# Patient Record
Sex: MALE | Race: WHITE | Employment: OTHER | URBAN - NONMETROPOLITAN AREA
[De-identification: names, ages, dates, MRNs, and addresses within clinical notes are randomized per-mention and may not be internally consistent; named-entity substitution may affect disease eponyms.]

---

## 2017-01-19 ENCOUNTER — OFFICE VISIT (OUTPATIENT)
Dept: NEUROLOGY | Age: 57
End: 2017-01-19
Payer: MEDICARE

## 2017-01-19 VITALS
WEIGHT: 315 LBS | HEART RATE: 63 BPM | SYSTOLIC BLOOD PRESSURE: 134 MMHG | RESPIRATION RATE: 16 BRPM | DIASTOLIC BLOOD PRESSURE: 72 MMHG | HEIGHT: 76 IN | BODY MASS INDEX: 38.36 KG/M2

## 2017-01-19 DIAGNOSIS — G25.81 RESTLESS LEG SYNDROME: ICD-10-CM

## 2017-01-19 DIAGNOSIS — R40.0 SOMNOLENCE, DAYTIME: ICD-10-CM

## 2017-01-19 DIAGNOSIS — G47.33 OBSTRUCTIVE SLEEP APNEA: Primary | ICD-10-CM

## 2017-01-19 PROCEDURE — G8484 FLU IMMUNIZE NO ADMIN: HCPCS | Performed by: PHYSICIAN ASSISTANT

## 2017-01-19 PROCEDURE — 4004F PT TOBACCO SCREEN RCVD TLK: CPT | Performed by: PHYSICIAN ASSISTANT

## 2017-01-19 PROCEDURE — 3017F COLORECTAL CA SCREEN DOC REV: CPT | Performed by: PHYSICIAN ASSISTANT

## 2017-01-19 PROCEDURE — 99203 OFFICE O/P NEW LOW 30 MIN: CPT | Performed by: PHYSICIAN ASSISTANT

## 2017-01-19 PROCEDURE — G8419 CALC BMI OUT NRM PARAM NOF/U: HCPCS | Performed by: PHYSICIAN ASSISTANT

## 2017-01-19 PROCEDURE — G8427 DOCREV CUR MEDS BY ELIG CLIN: HCPCS | Performed by: PHYSICIAN ASSISTANT

## 2017-01-19 RX ORDER — TRAMADOL HYDROCHLORIDE 50 MG/1
50 TABLET ORAL EVERY 6 HOURS PRN
COMMUNITY

## 2017-01-19 RX ORDER — GABAPENTIN 600 MG/1
1200 TABLET ORAL 3 TIMES DAILY
COMMUNITY

## 2017-01-19 RX ORDER — NIFEDIPINE 30 MG/1
30 TABLET, FILM COATED, EXTENDED RELEASE ORAL DAILY
COMMUNITY

## 2017-01-19 RX ORDER — CLONAZEPAM 0.5 MG/1
0.5 TABLET ORAL 3 TIMES DAILY PRN
COMMUNITY

## 2017-01-19 RX ORDER — TRAZODONE HYDROCHLORIDE 100 MG/1
100 TABLET ORAL NIGHTLY
COMMUNITY

## 2017-01-19 RX ORDER — BISOPROLOL FUMARATE 10 MG/1
10 TABLET ORAL DAILY
COMMUNITY

## 2017-01-19 RX ORDER — MONTELUKAST SODIUM 10 MG/1
10 TABLET ORAL NIGHTLY
COMMUNITY

## 2017-01-19 RX ORDER — LISINOPRIL 40 MG/1
40 TABLET ORAL DAILY
COMMUNITY

## 2017-01-19 RX ORDER — PREDNISONE 20 MG/1
TABLET ORAL
COMMUNITY
Start: 2015-07-26 | End: 2017-01-19

## 2017-01-19 RX ORDER — FLUVASTATIN 40 MG/1
40 CAPSULE ORAL NIGHTLY
COMMUNITY

## 2017-01-19 RX ORDER — FLUOXETINE HYDROCHLORIDE 40 MG/1
CAPSULE ORAL 2 TIMES DAILY
COMMUNITY

## 2017-01-19 RX ORDER — ALBUTEROL SULFATE 90 UG/1
2 AEROSOL, METERED RESPIRATORY (INHALATION) EVERY 6 HOURS PRN
COMMUNITY

## 2017-01-19 RX ORDER — MODAFINIL 200 MG/1
200 TABLET ORAL DAILY
COMMUNITY

## 2017-01-19 RX ORDER — PANTOPRAZOLE SODIUM 40 MG/1
40 TABLET, DELAYED RELEASE ORAL 2 TIMES DAILY
COMMUNITY

## 2017-01-19 RX ORDER — BACLOFEN 10 MG/1
10 TABLET ORAL PRN
COMMUNITY

## 2017-01-20 ENCOUNTER — TELEPHONE (OUTPATIENT)
Dept: NEUROLOGY | Age: 57
End: 2017-01-20

## 2017-05-25 ENCOUNTER — OFFICE VISIT (OUTPATIENT)
Dept: OTOLARYNGOLOGY | Facility: CLINIC | Age: 57
End: 2017-05-25

## 2017-05-25 VITALS
HEART RATE: 66 BPM | DIASTOLIC BLOOD PRESSURE: 72 MMHG | HEIGHT: 76 IN | BODY MASS INDEX: 38.36 KG/M2 | TEMPERATURE: 97.8 F | RESPIRATION RATE: 20 BRPM | WEIGHT: 315 LBS | SYSTOLIC BLOOD PRESSURE: 149 MMHG

## 2017-05-25 DIAGNOSIS — J34.89 NASAL VALVE COLLAPSE: ICD-10-CM

## 2017-05-25 DIAGNOSIS — G47.33 OSA (OBSTRUCTIVE SLEEP APNEA): Primary | ICD-10-CM

## 2017-05-25 DIAGNOSIS — J34.3 HYPERTROPHY OF BOTH INFERIOR NASAL TURBINATES: ICD-10-CM

## 2017-05-25 DIAGNOSIS — J34.2 NASAL SEPTAL DEVIATION: ICD-10-CM

## 2017-05-25 PROBLEM — J34.829 NASAL VALVE COLLAPSE: Status: ACTIVE | Noted: 2017-05-25

## 2017-05-25 PROBLEM — M95.0 NASAL VALVE COLLAPSE: Status: ACTIVE | Noted: 2017-05-25

## 2017-05-25 PROCEDURE — 99203 OFFICE O/P NEW LOW 30 MIN: CPT | Performed by: PHYSICIAN ASSISTANT

## 2017-05-25 RX ORDER — MONTELUKAST SODIUM 10 MG/1
10 TABLET ORAL NIGHTLY
COMMUNITY
End: 2019-04-25

## 2017-05-25 RX ORDER — ASPIRIN 81 MG/1
81 TABLET ORAL DAILY
COMMUNITY

## 2017-05-25 RX ORDER — ALBUTEROL SULFATE 4 MG/1
4 TABLET ORAL 3 TIMES DAILY PRN
Status: ON HOLD | COMMUNITY
End: 2017-10-13

## 2017-05-25 RX ORDER — SUCRALFATE 1 G/1
1 TABLET ORAL 2 TIMES DAILY
COMMUNITY
End: 2021-08-27

## 2017-05-25 RX ORDER — LISINOPRIL 40 MG/1
40 TABLET ORAL DAILY
COMMUNITY

## 2017-05-25 RX ORDER — TRAMADOL HYDROCHLORIDE 50 MG/1
50 TABLET ORAL EVERY 6 HOURS PRN
COMMUNITY
End: 2017-10-06

## 2017-05-25 RX ORDER — PANTOPRAZOLE SODIUM 40 MG/1
40 TABLET, DELAYED RELEASE ORAL 2 TIMES DAILY
COMMUNITY
End: 2021-08-27

## 2017-05-25 RX ORDER — NIFEDIPINE 30 MG/1
30 TABLET, EXTENDED RELEASE ORAL 2 TIMES DAILY
COMMUNITY

## 2017-05-25 RX ORDER — HYDROCHLOROTHIAZIDE 25 MG/1
25 TABLET ORAL DAILY
COMMUNITY
End: 2019-04-25

## 2017-05-25 RX ORDER — BISOPROLOL FUMARATE 5 MG/1
5 TABLET, FILM COATED ORAL NIGHTLY
COMMUNITY
End: 2019-04-25

## 2017-05-25 RX ORDER — LANOLIN ALCOHOL/MO/W.PET/CERES
1000 CREAM (GRAM) TOPICAL DAILY
COMMUNITY

## 2017-05-25 RX ORDER — FLUOXETINE HYDROCHLORIDE 40 MG/1
40 CAPSULE ORAL DAILY
COMMUNITY

## 2017-05-25 RX ORDER — GABAPENTIN 400 MG/1
400 CAPSULE ORAL 3 TIMES DAILY
COMMUNITY

## 2017-08-29 ENCOUNTER — OFFICE VISIT (OUTPATIENT)
Dept: OTOLARYNGOLOGY | Facility: CLINIC | Age: 57
End: 2017-08-29

## 2017-08-29 VITALS
WEIGHT: 315 LBS | HEART RATE: 68 BPM | TEMPERATURE: 97.3 F | SYSTOLIC BLOOD PRESSURE: 140 MMHG | HEIGHT: 76 IN | DIASTOLIC BLOOD PRESSURE: 70 MMHG | BODY MASS INDEX: 38.36 KG/M2

## 2017-08-29 DIAGNOSIS — J34.2 NASAL SEPTAL DEVIATION: Primary | ICD-10-CM

## 2017-08-29 DIAGNOSIS — J34.3 HYPERTROPHY OF BOTH INFERIOR NASAL TURBINATES: ICD-10-CM

## 2017-08-29 PROCEDURE — 99213 OFFICE O/P EST LOW 20 MIN: CPT | Performed by: OTOLARYNGOLOGY

## 2017-08-29 RX ORDER — OXYMETAZOLINE HYDROCHLORIDE 0.05 G/100ML
2 SPRAY NASAL
Status: CANCELLED | OUTPATIENT
Start: 2017-08-29 | End: 2017-08-29

## 2017-08-29 NOTE — PATIENT INSTRUCTIONS
SEPTOPLASTY/RESECTION OF SEPTAL SPUR AND TUBINOPLASTY: A septoplasty and inferior turbinoplasty were recommended. The risks and benefits were explained including but not limited to pain, bleeding, infection, risks of the general anesthesia, continued septal deviation, crusting, congestion and septal perforation. Possibilities of continued preoperative symptoms and the possible need for revision surgery and or medical therapy were discussed. Alternatives were discussed. No guarantees were made or implied. Questions were asked appropriately answered.      Patient and family were instructed on the proper use of scheduled prescription drugs including their impact on driving and the potential effects during pregnancy.  The potential for overdose was discussed and their safe storage and proper disposal.  The website www.[employer].[place].gov which contains other materials in this regard.

## 2017-08-29 NOTE — PROGRESS NOTES
YOB: 1960  Location: [facility]  Location Address: [address] [place] [address], [facility], [address] [place] [address]  Location Phone: [phone]    Chief Complaint   Patient presents with   • Follow-up     nasal problems, sleep apnea       History of Present Illness  This is a chronic problem. The current episode started more than 1 year ago. The problem occurs constantly. The problem has been unchanged (was improved with sleep surgery in the past). Associated symptoms include congestion. Pertinent negatives include no arthralgias, chills, coughing, diaphoresis, fatigue, fever, headaches, nausea, neck pain, numbness, rash, sore throat, vomiting or weakness. Exacerbated by: nasal congestion. Treatments tried: nasal surgery in . The treatment provided mild relief.    He has some nasal valve collapse on inspiration is primary difficulty with nasal congestion as dependent edema resulting in obstruction in the downward side as well as difficulty tolerating the CPAP mask.             Past Medical History:   Diagnosis Date   • Allergic rhinitis    • Allergic urticaria    • GERD (gastroesophageal reflux disease)    • Hyperlipidemia    • Hypertension    • Hypogonadism male    • Idiopathic peripheral neuropathy    • KHALIF (obstructive sleep apnea)    • Psoriasis    • PTSD (post-traumatic stress disorder)    • Recurrent major depressive disorder    • Restless leg    • Type 2 diabetes mellitus    • Vitamin B 12 deficiency    • Vitamin D deficiency        Past Surgical History:   Procedure Laterality Date   • CARPAL TUNNEL RELEASE      HANDS    • HEMORROIDECTOMY     • RHINOPLASTY           Current Outpatient Prescriptions:   •  albuterol (PROVENTIL) 4 MG tablet, Take 4 mg by mouth 3 (Three) Times a Day., Disp: , Rfl:   •  aspirin 81 MG EC tablet, Take 81 mg by mouth Daily., Disp: , Rfl:   •  bisoprolol (ZEBeta) 5 MG tablet, Take 5 mg by mouth Daily., Disp: , Rfl:   •  FLUoxetine (PROzac) 20 MG capsule, Take 20 mg by mouth  Daily., Disp: , Rfl:   •  gabapentin (NEURONTIN) 800 MG tablet, Take 800 mg by mouth 3 (Three) Times a Day., Disp: , Rfl:   •  hydrochlorothiazide (HYDRODIURIL) 25 MG tablet, Take 25 mg by mouth Daily., Disp: , Rfl:   •  insulin aspart (novoLOG) 100 UNIT/ML injection, Inject  under the skin 3 (Three) Times a Day Before Meals., Disp: , Rfl:   •  lisinopril (PRINIVIL,ZESTRIL) 40 MG tablet, Take 40 mg by mouth Daily., Disp: , Rfl:   •  metFORMIN (GLUCOPHAGE) 1000 MG tablet, Take 1,000 mg by mouth 2 (Two) Times a Day With Meals., Disp: , Rfl:   •  montelukast (SINGULAIR) 10 MG tablet, Take 10 mg by mouth Every Night., Disp: , Rfl:   •  NIFEdipine XL (PROCARDIA XL) 30 MG 24 hr tablet, Take 30 mg by mouth Daily., Disp: , Rfl:   •  pantoprazole (PROTONIX) 40 MG EC tablet, Take 40 mg by mouth Daily., Disp: , Rfl:   •  sucralfate (CARAFATE) 1 G tablet, Take 1 g by mouth 4 (Four) Times a Day., Disp: , Rfl:   •  traMADol (ULTRAM) 50 MG tablet, Take 50 mg by mouth Every 6 (Six) Hours As Needed for Moderate Pain (4-6)., Disp: , Rfl:   •  vitamin B-12 (CYANOCOBALAMIN) 1000 MCG tablet, Take 1,000 mcg by mouth Daily., Disp: , Rfl:     Atorvastatin; Ciprofloxacin; Crestor [rosuvastatin calcium]; Flonase [fluticasone propionate]; Fluvastatin; Gemfibrozil; Levaquin [levofloxacin]; Niacin and related; Penicillins; Pravastatin; Simvastatin; and Yellow dyes (non-tartrazine)    Family History   Problem Relation Age of Onset   • Diabetes Mother    • Diabetes Father        Social History     Social History   • Marital status:      Spouse name: N/A   • Number of children: N/A   • Years of education: N/A     Occupational History   • Not on file.     Social History Main Topics   • Smoking status: Never Smoker   • Smokeless tobacco: Not on file   • Alcohol use No   • Drug use: Not on file   • Sexual activity: Not on file     Other Topics Concern   • Not on file     Social History Narrative       Review of Systems   Constitutional:  Negative.    HENT: Positive for congestion.    Eyes: Negative.    Respiratory: Positive for apnea.    Cardiovascular: Negative.    Gastrointestinal: Negative.    Musculoskeletal: Negative.    Skin: Negative.    Allergic/Immunologic: Negative.    Neurological: Negative.    Hematological: Negative.    Psychiatric/Behavioral: Negative.        Vitals:    08/29/17 1127   BP: 140/70   Pulse: 68   Temp: 97.3 °F (36.3 °C)       Objective     Physical Exam  CONSTITUTIONAL: Obese, well nourished, alert, oriented, in no acute distress     COMMUNICATION AND VOICE: able to communicate normally, normal voice quality    HEAD: normocephalic, no lesions, atraumatic, no tenderness, no masses     FACE: appearance normal, no lesions, no tenderness, no deformities, facial motion symmetric    SALIVARY GLANDS: parotid glands with no tenderness, no swelling, no masses, submandibular glands with normal size, nontender    EYES: ocular motility normal, eyelids normal, orbits normal, no proptosis, conjunctiva normal , pupils equal, round     EARS:  Hearing: response to conversational voice normal bilaterally   External Ears: auricles without lesions  Otoscopic: tympanic membrane appearance normal, no lesions, no perforation, normal mobility, no fluid    NOSE:  External Nose: structure normal, no tenderness on palpation, no nasal discharge, no lesions, no evidence of trauma, nostrils patent   Intranasal Exam: nasal mucosa normal, vestibule within normal limits-with mild inflammation, inferior turbinate with moderate bilateral hypertrophy, nasal septum midline with spurring on the left   Nasopharynx:     ORAL:  Lips: upper and lower lips without lesion   Teeth: dentition within normal limits for age   Gums: gingivae healthy   Oral Mucosa: oral mucosa normal, no mucosal lesions   Floor of Mouth: Warthin’s duct patent, mucosa normal  Tongue: lingual mucosa normal without lesions, normal tongue mobility   Palate: soft and hard palates with normal  mucosa and structure without uvula-surgically absent  Oropharynx: oropharyngeal mucosa redundant    HYPOPHARYNX:   LARYNX:     NECK: neck appearance normal, no mass,  noted without erythema or tenderness    THYROID: no overt thyromegaly, no tenderness, nodules or mass present on palpation, position midline     LYMPH NODES: no lymphadenopathy    CHEST/RESPIRATORY: respiratory effort normal, normal breath sounds     CARDIOVASCULAR: rate and rhythm normal, extremities without cyanosis or edema      NEUROLOGIC/PSYCHIATRIC: oriented to time, place and person, mood normal, affect appropriate, CN II-XII intact grossly    Assessment/Plan   Dada was seen today for follow-up.    Diagnoses and all orders for this visit:    Nasal septal deviation  -     Case Request; Standing  -     Comprehensive Metabolic Panel; Future  -     CBC (No Diff); Future  -     Protime-INR; Future  -     APTT; Future  -     ECG 12 Lead; Future  -     oxymetazoline (AFRIN) nasal spray 2 spray; 2 sprays by Each Nare route Every 5 (Five) Minutes.  -     XR Chest 2 View; Future  -     Case Request    Hypertrophy of both inferior nasal turbinates  -     Case Request; Standing  -     Comprehensive Metabolic Panel; Future  -     CBC (No Diff); Future  -     Protime-INR; Future  -     APTT; Future  -     ECG 12 Lead; Future  -     oxymetazoline (AFRIN) nasal spray 2 spray; 2 sprays by Each Nare route Every 5 (Five) Minutes.  -     XR Chest 2 View; Future  -     Case Request    Other orders  -     Follow Anesthesia Guidelines / Standing Orders; Future  -     Obtain Informed Consent  -     Follow Anesthesia Guidelines / Standing Orders; Standing  -     Verify NPO Status; Standing  -     NPO Diet; Standing  -     Obtain Informed Consent; Standing  -     CARA Hose - To Be Placed on Patient in Pre-Op; Standing  -     SCD (Sequential Compression Device) - To Be Placed on Patient in Pre-Op; Standing      SEPTOPLASTY, RESECTION INFERIOR TURBINATES (N/A)  Orders  Placed This Encounter   Procedures   • XR Chest 2 View     Standing Status:   Future     Standing Expiration Date:   8/29/2018     Order Specific Question:   Reason for Exam:     Answer:   SEPTOPLASTY, RESECTION INFERIOR TURBINATES   • Comprehensive Metabolic Panel     Standing Status:   Future     Standing Expiration Date:   8/29/2018   • CBC (No Diff)     Standing Status:   Future     Standing Expiration Date:   8/29/2018   • Protime-INR     Standing Status:   Future     Standing Expiration Date:   8/29/2018   • APTT     Standing Status:   Future     Standing Expiration Date:   8/29/2018   • Follow Anesthesia Guidelines / Standing Orders     Standing Status:   Future   • Obtain Informed Consent     Order Specific Question:   Informed Consent Given For     Answer:   SEPTOPLASTY, RESECTION INFERIOR TURBINATES   • ECG 12 Lead     Standing Status:   Future     Standing Expiration Date:   8/29/2018     Order Specific Question:   Reason for Exam:     Answer:   preop     Return for postop.       Patient Instructions   SEPTOPLASTY/RESECTION OF SEPTAL SPUR AND TUBINOPLASTY: A septoplasty and inferior turbinoplasty were recommended. The risks and benefits were explained including but not limited to pain, bleeding, infection, risks of the general anesthesia, continued septal deviation, crusting, congestion and septal perforation. Possibilities of continued preoperative symptoms and the possible need for revision surgery and or medical therapy were discussed. Alternatives were discussed. No guarantees were made or implied. Questions were asked appropriately answered.      Patient and family were instructed on the proper use of scheduled prescription drugs including their impact on driving and the potential effects during pregnancy.  The potential for overdose was discussed and their safe storage and proper disposal.  The website www.[employer].ky.gov which contains other materials in this regard.

## 2017-09-11 ENCOUNTER — APPOINTMENT (OUTPATIENT)
Dept: PREADMISSION TESTING | Facility: HOSPITAL | Age: 57
End: 2017-09-11

## 2017-09-11 ENCOUNTER — TELEPHONE (OUTPATIENT)
Dept: OTOLARYNGOLOGY | Facility: CLINIC | Age: 57
End: 2017-09-11

## 2017-09-11 NOTE — TELEPHONE ENCOUNTER
----- Message from Samantha Smith RN sent at 8/29/2017 12:58 PM CDT -----      ----- Message -----     From: Samantha Smith RN     Sent: 8/29/2017  12:58 PM       To: Samantha Smith RN    Cl with Dr. Mercedes to dc asa    9/11/17 Krystina with Dr. Mercedes called and patient is clear to stop aspirin 7 days prior to surgery. I have contacted patient and he wants to cancel surgery at this time. He will call back to reschedule

## 2017-10-05 NOTE — H&P
YOB: 1960  Location: [facility]  Location Address: [address] [place] [address], [facility], [address] [place] [address]  Location Phone: [phone]          Chief Complaint   Patient presents with   • Follow-up       nasal problems, sleep apnea         History of Present Illness  This is a chronic problem. The current episode started more than 1 year ago. The problem occurs constantly. The problem has been unchanged (was improved with sleep surgery in the past). Associated symptoms include congestion. Pertinent negatives include no arthralgias, chills, coughing, diaphoresis, fatigue, fever, headaches, nausea, neck pain, numbness, rash, sore throat, vomiting or weakness. Exacerbated by: nasal congestion. Treatments tried: nasal surgery in . The treatment provided mild relief.     He has some nasal valve collapse on inspiration is primary difficulty with nasal congestion as dependent edema resulting in obstruction in the downward side as well as difficulty tolerating the CPAP mask.                   Medical History         Past Medical History:   Diagnosis Date   • Allergic rhinitis     • Allergic urticaria     • GERD (gastroesophageal reflux disease)     • Hyperlipidemia     • Hypertension     • Hypogonadism male     • Idiopathic peripheral neuropathy     • KHALIF (obstructive sleep apnea)     • Psoriasis     • PTSD (post-traumatic stress disorder)     • Recurrent major depressive disorder     • Restless leg     • Type 2 diabetes mellitus     • Vitamin B 12 deficiency     • Vitamin D deficiency               Surgical History          Past Surgical History:   Procedure Laterality Date   • CARPAL TUNNEL RELEASE         HANDS    • HEMORROIDECTOMY       • RHINOPLASTY                   Current Outpatient Prescriptions:   •  albuterol (PROVENTIL) 4 MG tablet, Take 4 mg by mouth 3 (Three) Times a Day., Disp: , Rfl:   •  aspirin 81 MG EC tablet, Take 81 mg by mouth Daily., Disp: , Rfl:   •  bisoprolol (ZEBeta) 5 MG  tablet, Take 5 mg by mouth Daily., Disp: , Rfl:   •  FLUoxetine (PROzac) 20 MG capsule, Take 20 mg by mouth Daily., Disp: , Rfl:   •  gabapentin (NEURONTIN) 800 MG tablet, Take 800 mg by mouth 3 (Three) Times a Day., Disp: , Rfl:   •  hydrochlorothiazide (HYDRODIURIL) 25 MG tablet, Take 25 mg by mouth Daily., Disp: , Rfl:   •  insulin aspart (novoLOG) 100 UNIT/ML injection, Inject  under the skin 3 (Three) Times a Day Before Meals., Disp: , Rfl:   •  lisinopril (PRINIVIL,ZESTRIL) 40 MG tablet, Take 40 mg by mouth Daily., Disp: , Rfl:   •  metFORMIN (GLUCOPHAGE) 1000 MG tablet, Take 1,000 mg by mouth 2 (Two) Times a Day With Meals., Disp: , Rfl:   •  montelukast (SINGULAIR) 10 MG tablet, Take 10 mg by mouth Every Night., Disp: , Rfl:   •  NIFEdipine XL (PROCARDIA XL) 30 MG 24 hr tablet, Take 30 mg by mouth Daily., Disp: , Rfl:   •  pantoprazole (PROTONIX) 40 MG EC tablet, Take 40 mg by mouth Daily., Disp: , Rfl:   •  sucralfate (CARAFATE) 1 G tablet, Take 1 g by mouth 4 (Four) Times a Day., Disp: , Rfl:   •  traMADol (ULTRAM) 50 MG tablet, Take 50 mg by mouth Every 6 (Six) Hours As Needed for Moderate Pain (4-6)., Disp: , Rfl:   •  vitamin B-12 (CYANOCOBALAMIN) 1000 MCG tablet, Take 1,000 mcg by mouth Daily., Disp: , Rfl:      Atorvastatin; Ciprofloxacin; Crestor [rosuvastatin calcium]; Flonase [fluticasone propionate]; Fluvastatin; Gemfibrozil; Levaquin [levofloxacin]; Niacin and related; Penicillins; Pravastatin; Simvastatin; and Yellow dyes (non-tartrazine)           Family History   Problem Relation Age of Onset   • Diabetes Mother     • Diabetes Father            Social History    Social History            Social History   • Marital status:        Spouse name: N/A   • Number of children: N/A   • Years of education: N/A          Occupational History   • Not on file.           Social History Main Topics   • Smoking status: Never Smoker   • Smokeless tobacco: Not on file   • Alcohol use No   • Drug use: Not  on file   • Sexual activity: Not on file           Other Topics Concern   • Not on file      Social History Narrative            Review of Systems   Constitutional: Negative.    HENT: Positive for congestion.    Eyes: Negative.    Respiratory: Positive for apnea.    Cardiovascular: Negative.    Gastrointestinal: Negative.    Musculoskeletal: Negative.    Skin: Negative.    Allergic/Immunologic: Negative.    Neurological: Negative.    Hematological: Negative.    Psychiatric/Behavioral: Negative.              Vitals:     08/29/17 1127   BP: 140/70   Pulse: 68   Temp: 97.3 °F (36.3 °C)            Objective          Physical Exam  CONSTITUTIONAL: Obese, well nourished, alert, oriented, in no acute distress      COMMUNICATION AND VOICE: able to communicate normally, normal voice quality     HEAD: normocephalic, no lesions, atraumatic, no tenderness, no masses      FACE: appearance normal, no lesions, no tenderness, no deformities, facial motion symmetric     SALIVARY GLANDS: parotid glands with no tenderness, no swelling, no masses, submandibular glands with normal size, nontender     EYES: ocular motility normal, eyelids normal, orbits normal, no proptosis, conjunctiva normal , pupils equal, round      EARS:  Hearing: response to conversational voice normal bilaterally   External Ears: auricles without lesions  Otoscopic: tympanic membrane appearance normal, no lesions, no perforation, normal mobility, no fluid     NOSE:  External Nose: structure normal, no tenderness on palpation, no nasal discharge, no lesions, no evidence of trauma, nostrils patent   Intranasal Exam: nasal mucosa normal, vestibule within normal limits-with mild inflammation, inferior turbinate with moderate bilateral hypertrophy, nasal septum midline with spurring on the left   Nasopharynx:      ORAL:  Lips: upper and lower lips without lesion   Teeth: dentition within normal limits for age   Gums: gingivae healthy   Oral Mucosa: oral mucosa normal,  no mucosal lesions   Floor of Mouth: Warthin’s duct patent, mucosa normal  Tongue: lingual mucosa normal without lesions, normal tongue mobility   Palate: soft and hard palates with normal mucosa and structure without uvula-surgically absent  Oropharynx: oropharyngeal mucosa redundant     HYPOPHARYNX:   LARYNX:      NECK: neck appearance normal, no mass,  noted without erythema or tenderness     THYROID: no overt thyromegaly, no tenderness, nodules or mass present on palpation, position midline      LYMPH NODES: no lymphadenopathy     CHEST/RESPIRATORY: respiratory effort normal, normal breath sounds      CARDIOVASCULAR: rate and rhythm normal, extremities without cyanosis or edema       NEUROLOGIC/PSYCHIATRIC: oriented to time, place and person, mood normal, affect appropriate, CN II-XII intact grossly        Assessment/Plan       Dada was seen today for follow-up.     Diagnoses and all orders for this visit:     Nasal septal deviation  -     Case Request; Standing  -     Comprehensive Metabolic Panel; Future  -     CBC (No Diff); Future  -     Protime-INR; Future  -     APTT; Future  -     ECG 12 Lead; Future  -     oxymetazoline (AFRIN) nasal spray 2 spray; 2 sprays by Each Nare route Every 5 (Five) Minutes.  -     XR Chest 2 View; Future  -     Case Request     Hypertrophy of both inferior nasal turbinates  -     Case Request; Standing  -     Comprehensive Metabolic Panel; Future  -     CBC (No Diff); Future  -     Protime-INR; Future  -     APTT; Future  -     ECG 12 Lead; Future  -     oxymetazoline (AFRIN) nasal spray 2 spray; 2 sprays by Each Nare route Every 5 (Five) Minutes.  -     XR Chest 2 View; Future  -     Case Request     Other orders  -     Follow Anesthesia Guidelines / Standing Orders; Future  -     Obtain Informed Consent  -     Follow Anesthesia Guidelines / Standing Orders; Standing  -     Verify NPO Status; Standing  -     NPO Diet; Standing  -     Obtain Informed Consent; Standing  -      CARA Hose - To Be Placed on Patient in Pre-Op; Standing  -     SCD (Sequential Compression Device) - To Be Placed on Patient in Pre-Op; Standing        SEPTOPLASTY, RESECTION INFERIOR TURBINATES (N/A)        Orders Placed This Encounter   Procedures   • XR Chest 2 View       Standing Status:   Future       Standing Expiration Date:   8/29/2018       Order Specific Question:   Reason for Exam:       Answer:   SEPTOPLASTY, RESECTION INFERIOR TURBINATES   • Comprehensive Metabolic Panel       Standing Status:   Future       Standing Expiration Date:   8/29/2018   • CBC (No Diff)       Standing Status:   Future       Standing Expiration Date:   8/29/2018   • Protime-INR       Standing Status:   Future       Standing Expiration Date:   8/29/2018   • APTT       Standing Status:   Future       Standing Expiration Date:   8/29/2018   • Follow Anesthesia Guidelines / Standing Orders       Standing Status:   Future   • Obtain Informed Consent       Order Specific Question:   Informed Consent Given For       Answer:   SEPTOPLASTY, RESECTION INFERIOR TURBINATES   • ECG 12 Lead       Standing Status:   Future       Standing Expiration Date:   8/29/2018       Order Specific Question:   Reason for Exam:       Answer:   preop      Return for postop.        Patient Instructions   SEPTOPLASTY/RESECTION OF SEPTAL SPUR AND TUBINOPLASTY: A septoplasty and inferior turbinoplasty were recommended. The risks and benefits were explained including but not limited to pain, bleeding, infection, risks of the general anesthesia, continued septal deviation, crusting, congestion and septal perforation. Possibilities of continued preoperative symptoms and the possible need for revision surgery and or medical therapy were discussed. Alternatives were discussed. No guarantees were made or implied. Questions were asked appropriately answered.       Patient and family were instructed on the proper use of scheduled prescription drugs including their  impact on driving and the potential effects during pregnancy.  The potential for overdose was discussed and their safe storage and proper disposal.  The website www.[employer].ky.gov which contains other materials in this regard.

## 2017-10-06 ENCOUNTER — HOSPITAL ENCOUNTER (OUTPATIENT)
Dept: GENERAL RADIOLOGY | Facility: HOSPITAL | Age: 57
Discharge: HOME | End: 2017-10-06
Admitting: OTOLARYNGOLOGY

## 2017-10-06 ENCOUNTER — APPOINTMENT (OUTPATIENT)
Dept: PREADMISSION TESTING | Facility: HOSPITAL | Age: 57
End: 2017-10-06

## 2017-10-06 VITALS
OXYGEN SATURATION: 97 % | BODY MASS INDEX: 38.36 KG/M2 | DIASTOLIC BLOOD PRESSURE: 72 MMHG | TEMPERATURE: 98.4 F | RESPIRATION RATE: 18 BRPM | SYSTOLIC BLOOD PRESSURE: 152 MMHG | HEIGHT: 76 IN | HEART RATE: 75 BPM | WEIGHT: 315 LBS

## 2017-10-06 DIAGNOSIS — J34.3 HYPERTROPHY OF BOTH INFERIOR NASAL TURBINATES: ICD-10-CM

## 2017-10-06 DIAGNOSIS — J34.2 NASAL SEPTAL DEVIATION: ICD-10-CM

## 2017-10-06 LAB
ALBUMIN SERPL-MCNC: 4.2 G/DL (ref 3.5–5)
ALBUMIN/GLOB SERPL: 1.4 G/DL (ref 1.1–2.5)
ALP SERPL-CCNC: 58 U/L (ref 24–120)
ALT SERPL W P-5'-P-CCNC: 60 U/L (ref 0–54)
ANION GAP SERPL CALCULATED.3IONS-SCNC: 11 MMOL/L (ref 4–13)
APTT PPP: 22.6 SECONDS (ref 24.1–34.8)
AST SERPL-CCNC: 44 U/L (ref 7–45)
BILIRUB SERPL-MCNC: 0.2 MG/DL (ref 0.1–1)
BUN BLD-MCNC: 18 MG/DL (ref 5–21)
BUN/CREAT SERPL: 16.4 (ref 7–25)
CALCIUM SPEC-SCNC: 9.6 MG/DL (ref 8.4–10.4)
CHLORIDE SERPL-SCNC: 100 MMOL/L (ref 98–110)
CO2 SERPL-SCNC: 29 MMOL/L (ref 24–31)
CREAT BLD-MCNC: 1.1 MG/DL (ref 0.5–1.4)
DEPRECATED RDW RBC AUTO: 40.5 FL (ref 40–54)
ERYTHROCYTE [DISTWIDTH] IN BLOOD BY AUTOMATED COUNT: 12.8 % (ref 12–15)
GFR SERPL CREATININE-BSD FRML MDRD: 69 ML/MIN/1.73
GLOBULIN UR ELPH-MCNC: 2.9 GM/DL
GLUCOSE BLD-MCNC: 279 MG/DL (ref 70–100)
HCT VFR BLD AUTO: 39.7 % (ref 40–52)
HGB BLD-MCNC: 13.7 G/DL (ref 14–18)
INR PPP: 0.83 (ref 0.91–1.09)
MCH RBC QN AUTO: 29.9 PG (ref 28–32)
MCHC RBC AUTO-ENTMCNC: 34.5 G/DL (ref 33–36)
MCV RBC AUTO: 86.7 FL (ref 82–95)
PLATELET # BLD AUTO: 267 10*3/MM3 (ref 130–400)
PMV BLD AUTO: 10.6 FL (ref 6–12)
POTASSIUM BLD-SCNC: 5 MMOL/L (ref 3.5–5.3)
PROT SERPL-MCNC: 7.1 G/DL (ref 6.3–8.7)
PROTHROMBIN TIME: 11.6 SECONDS (ref 11.9–14.6)
RBC # BLD AUTO: 4.58 10*6/MM3 (ref 4.8–5.9)
SODIUM BLD-SCNC: 140 MMOL/L (ref 135–145)
WBC NRBC COR # BLD: 10.75 10*3/MM3 (ref 4.8–10.8)

## 2017-10-06 PROCEDURE — 80053 COMPREHEN METABOLIC PANEL: CPT | Performed by: OTOLARYNGOLOGY

## 2017-10-06 PROCEDURE — 36415 COLL VENOUS BLD VENIPUNCTURE: CPT

## 2017-10-06 PROCEDURE — 71020 HC CHEST PA AND LATERAL: CPT

## 2017-10-06 PROCEDURE — 85730 THROMBOPLASTIN TIME PARTIAL: CPT | Performed by: OTOLARYNGOLOGY

## 2017-10-06 PROCEDURE — 93010 ELECTROCARDIOGRAM REPORT: CPT | Performed by: INTERNAL MEDICINE

## 2017-10-06 PROCEDURE — 93005 ELECTROCARDIOGRAM TRACING: CPT

## 2017-10-06 PROCEDURE — 85027 COMPLETE CBC AUTOMATED: CPT | Performed by: OTOLARYNGOLOGY

## 2017-10-06 PROCEDURE — 85610 PROTHROMBIN TIME: CPT | Performed by: OTOLARYNGOLOGY

## 2017-10-06 RX ORDER — INSULIN GLARGINE 100 [IU]/ML
50 INJECTION, SOLUTION SUBCUTANEOUS
COMMUNITY

## 2017-10-06 RX ORDER — BACLOFEN 10 MG/1
10 TABLET ORAL AS NEEDED
COMMUNITY

## 2017-10-06 RX ORDER — MODAFINIL 100 MG/1
100 TABLET ORAL DAILY
COMMUNITY
End: 2019-04-25

## 2017-10-06 RX ORDER — GLUCOSAMINE HCL 500 MG
2000 TABLET ORAL NIGHTLY
COMMUNITY

## 2017-10-06 NOTE — DISCHARGE INSTRUCTIONS
DAY OF SURGERY INSTRUCTIONS        YOUR SURGEON: Ge     PROCEDURE: septoplasty    DATE OF SURGERY: 10-13-17    ARRIVAL TIME: AS DIRECTED BY OFFICE    DAY OF SURGERY TAKE ONLY THESE MEDICATIONS: Nifedipine, bisoprolol            BEFORE YOU COME TO THE HOSPITAL  (Pre-op instructions)  • Do not eat, drink, smoke or chew gum after midnight the night before surgery.  This also includes no mints.  • Morning of surgery take only the medicines you have been instructed with a sip of water unless otherwise instructed  by your physician.  • Do not shave, wear makeup or dark nail polish.  • Remove all jewelry including rings.  • Leave anything you consider valuable at home.  • Leave your suitcase in the car until after your surgery.  • Bring the following with you if applicable:  o Picture ID and insurance, Medicare or Medicaid cards  o Co-pay/deductible required by insurance (cash, check, credit card)  o Copy of advance directive, living will or power-of- documents if not brought to PAT  o CPAP or BIPAP mask and tubing  o Relaxation aids (MP3 player, book, magazine)  • On the day of surgery check in at registration located at the main entrance of the hospital.       Outpatient Surgery Guidelines, Adult  Outpatient procedures are those for which the person having the procedure is allowed to go home the same day as the procedure. Various procedures are done on an outpatient basis. You should follow some general guidelines if you will be having an outpatient procedure.  LET YOUR HEALTH CARE PROVIDER KNOW ABOUT:  · Any allergies you have.  · All medicines you are taking, including vitamins, herbs, eye drops, creams, and over-the-counter medicines.  · Previous problems you or members of your family have had with the use of anesthetics.  · Any blood disorders you have.  · Previous surgeries you have had.  · Medical conditions you have.  RISKS AND COMPLICATIONS  Your health care provider will discuss possible risks and  complications with you before surgery. Common risks and complications include:    · Problems due to the use of anesthetics.  · Blood loss and replacement (does not apply to minor surgical procedures).  · Temporary increase in pain due to surgery.  · Uncorrected pain or problems that the surgery was meant to correct.  · Infection.  · New damage.  BEFORE THE PROCEDURE  · Ask your health care provider about changing or stopping your regular medicines. You may need to stop taking certain medicines in the days or weeks before the procedure.  · Stop smoking at least 2 weeks before surgery. This lowers your risk for complications during and after surgery. Ask your health care provider for help with this if needed.  · Eat your usual meals and a light supper the day before surgery. Continue fluid intake. Do not drink alcohol.  · Do not eat or drink after midnight the night before your surgery.   · Arrange for someone to take you home and to stay with you for 24 hours after the procedure. Medicine given for your procedure may affect your ability to drive or to care for yourself.  · Call your health care provider's office if you develop an illness or problem that may prevent you from safely having your procedure.  AFTER THE PROCEDURE  After surgery, you will be taken to a recovery area, where your progress will be monitored. If there are no complications, you will be allowed to go home when you are awake, stable, and taking fluids well. You may have numbness around the surgical site. Healing will take some time. You will have tenderness at the surgical site and may have some swelling and bruising. You may also have some nausea.  HOME CARE INSTRUCTIONS  · Do not drive for 24 hours, or as directed by your health care provider. Do not drive while taking prescription pain medicines.  · Do not drink alcohol for 24 hours.  · Do not make important decisions or sign legal documents for 24 hours.  · You may resume a normal diet and  activities as directed.  · Do not lift anything heavier than 10 pounds (4.5 kg) or play contact sports until your health care provider says it is okay.  · Change your bandages (dressings) as directed.  · Only take over-the-counter or prescription medicines as directed by your health care provider.  · Follow up with your health care provider as directed.  SEEK MEDICAL CARE IF:  · You have increased bleeding (more than a small spot) from the surgical site.  · You have redness, swelling, or increasing pain in the wound.  · You see pus coming from the wound.  · You have a fever.  · You notice a bad smell coming from the wound or dressing.  · You feel lightheaded or faint.  · You develop a rash.  · You have trouble breathing.  · You develop allergies.  MAKE SURE YOU:  · Understand these instructions.  · Will watch your condition.  · Will get help right away if you are not doing well or get worse.     This information is not intended to replace advice given to you by your health care provider. Make sure you discuss any questions you have with your health care provider.     Document Released: 09/12/2002 Document Revised: 05/03/2016 Document Reviewed: 05/22/2014  [employer] Interactive Patient Education ©2016 [employer]       Fall Prevention in Hospitals, Adult  As a hospital patient, your condition and the treatments you receive can increase your risk for falls. Some additional risk factors for falls in a hospital include:  · Being in an unfamiliar environment.  · Being on bed rest.  · Your surgery.  · Taking certain medicines.  · Your tubing requirements, such as intravenous (IV) therapy or catheters.  It is important that you learn how to decrease fall risks while at the hospital. Below are important tips that can help prevent falls.  SAFETY TIPS FOR PREVENTING FALLS  Talk about your risk of falling.  · Ask your health care provider why you are at risk for falling. Is it your medicine, illness, tubing placement, or  something else?  · Make a plan with your health care provider to keep you safe from falls.  · Ask your health care provider or pharmacist about side effects of your medicines. Some medicines can make you dizzy or affect your coordination.  Ask for help.  · Ask for help before getting out of bed. You may need to press your call button.  · Ask for assistance in getting safely to the toilet.  · Ask for a walker or cane to be put at your bedside. Ask that most of the side rails on your bed be placed up before your health care provider leaves the room.  · Ask family or friends to sit with you.  · Ask for things that are out of your reach, such as your glasses, hearing aids, telephone, bedside table, or call button.  Follow these tips to avoid falling:  · Stay lying or seated, rather than standing, while waiting for help.  · Wear rubber-soled slippers or shoes whenever you walk in the hospital.  · Avoid quick, sudden movements.  ¨ Change positions slowly.  ¨ Sit on the side of your bed before standing.  ¨ Stand up slowly and wait before you start to walk.  · Let your health care provider know if there is a spill on the floor.  · Pay careful attention to the medical equipment, electrical cords, and tubes around you.  · When you need help, use your call button by your bed or in the bathroom. Wait for one of your health care providers to help you.  · If you feel dizzy or unsure of your footing, return to bed and wait for assistance.  · Avoid being distracted by the TV, telephone, or another person in your room.  · Do not lean or support yourself on rolling objects, such as IV poles or bedside tables.     This information is not intended to replace advice given to you by your health care provider. Make sure you discuss any questions you have with your health care provider.     Document Released: 12/15/2001 Document Revised: 01/08/2016 Document Reviewed: 08/25/2013  Elsevier Interactive Patient Education ©2016 [employer]       Surgical Site Infections FAQs  What is a Surgical Site Infection (SSI)?  A surgical site infection is an infection that occurs after surgery in the part of the body where the surgery took place. Most patients who have surgery do not develop an infection. However, infections develop in about 1 to 3 out of every 100 patients who have surgery.  Some of the common symptoms of a surgical site infection are:  · Redness and pain around the area where you had surgery  · Drainage of cloudy fluid from your surgical wound  · Fever  Can SSIs be treated?  Yes. Most surgical site infections can be treated with antibiotics. The antibiotic given to you depends on the bacteria (germs) causing the infection. Sometimes patients with SSIs also need another surgery to treat the infection.  What are some of the things that hospitals are doing to prevent SSIs?  To prevent SSIs, doctors, nurses, and other healthcare providers:  · Clean their hands and arms up to their elbows with an antiseptic agent just before the surgery.  · Clean their hands with soap and water or an alcohol-based hand rub before and after caring for each patient.  · May remove some of your hair immediately before your surgery using electric clippers if the hair is in the same area where the procedure will occur. They should not shave you with a razor.  · Wear special hair covers, masks, gowns, and gloves during surgery to keep the surgery area clean.  · Give you antibiotics before your surgery starts. In most cases, you should get antibiotics within 60 minutes before the surgery starts and the antibiotics should be stopped within 24 hours after surgery.  · Clean the skin at the site of your surgery with a special soap that kills germs.  What can I do to help prevent SSIs?  Before your surgery:  · Tell your doctor about other medical problems you may have. Health problems such as allergies, diabetes, and obesity could affect your surgery and your  treatment.  · Quit smoking. Patients who smoke get more infections. Talk to your doctor about how you can quit before your surgery.  · Do not shave near where you will have surgery. Shaving with a razor can irritate your skin and make it easier to develop an infection.  At the time of your surgery:  · Speak up if someone tries to shave you with a razor before surgery. Ask why you need to be shaved and talk with your surgeon if you have any concerns.  · Ask if you will get antibiotics before surgery.  After your surgery:  · Make sure that your healthcare providers clean their hands before examining you, either with soap and water or an alcohol-based hand rub.  · If you do not see your providers clean their hands, please ask them to do so.  · Family and friends who visit you should not touch the surgical wound or dressings.  · Family and friends should clean their hands with soap and water or an alcohol-based hand rub before and after visiting you. If you do not see them clean their hands, ask them to clean their hands.  What do I need to do when I go home from the hospital?  · Before you go home, your doctor or nurse should explain everything you need to know about taking care of your wound. Make sure you understand how to care for your wound before you leave the hospital.  · Always clean your hands before and after caring for your wound.  · Before you go home, make sure you know who to contact if you have questions or problems after you get home.  · If you have any symptoms of an infection, such as redness and pain at the surgery site, drainage, or fever, call your doctor immediately.  If you have additional questions, please ask your doctor or nurse.  Developed and co-sponsored by The Society for Healthcare Epidemiology of [place] (SHEA); Infectious Diseases Society of [place] (IDSA); American Hospital Association; Association for Professionals in Infection Control and Epidemiology (APIC); Centers for Disease  Control and Prevention (CDC); and The Joint Commission.     This information is not intended to replace advice given to you by your health care provider. Make sure you discuss any questions you have with your health care provider.     Document Released: 12/23/2014 Document Revised: 01/08/2016 Document Reviewed: 03/02/2016  Elsevier Interactive Patient Education ©2016 [employer] Inc.

## 2017-10-13 ENCOUNTER — ANESTHESIA EVENT (OUTPATIENT)
Dept: PERIOP | Facility: HOSPITAL | Age: 57
End: 2017-10-13

## 2017-10-13 ENCOUNTER — ANESTHESIA (OUTPATIENT)
Dept: PERIOP | Facility: HOSPITAL | Age: 57
End: 2017-10-13

## 2017-10-13 ENCOUNTER — HOSPITAL ENCOUNTER (OUTPATIENT)
Facility: HOSPITAL | Age: 57
Setting detail: HOSPITAL OUTPATIENT SURGERY
Discharge: HOME | End: 2017-10-13
Attending: OTOLARYNGOLOGY | Admitting: OTOLARYNGOLOGY

## 2017-10-13 VITALS
RESPIRATION RATE: 16 BRPM | SYSTOLIC BLOOD PRESSURE: 137 MMHG | DIASTOLIC BLOOD PRESSURE: 60 MMHG | HEART RATE: 69 BPM | OXYGEN SATURATION: 95 % | TEMPERATURE: 98.8 F

## 2017-10-13 DIAGNOSIS — J34.2 NASAL SEPTAL DEVIATION: ICD-10-CM

## 2017-10-13 DIAGNOSIS — J34.3 HYPERTROPHY OF BOTH INFERIOR NASAL TURBINATES: ICD-10-CM

## 2017-10-13 LAB
GLUCOSE BLDC GLUCOMTR-MCNC: 157 MG/DL (ref 70–130)
GLUCOSE BLDC GLUCOMTR-MCNC: 165 MG/DL (ref 70–130)

## 2017-10-13 PROCEDURE — 25010000002 SUCCINYLCHOLINE PER 20 MG: Performed by: NURSE ANESTHETIST, CERTIFIED REGISTERED

## 2017-10-13 PROCEDURE — 30802 ABLATE INF TURBINATE SUBMUC: CPT | Performed by: OTOLARYNGOLOGY

## 2017-10-13 PROCEDURE — 25010000002 ONDANSETRON PER 1 MG: Performed by: NURSE ANESTHETIST, CERTIFIED REGISTERED

## 2017-10-13 PROCEDURE — 25010000002 FENTANYL CITRATE (PF) 250 MCG/5ML SOLUTION: Performed by: NURSE ANESTHETIST, CERTIFIED REGISTERED

## 2017-10-13 PROCEDURE — 30520 REPAIR OF NASAL SEPTUM: CPT | Performed by: OTOLARYNGOLOGY

## 2017-10-13 PROCEDURE — 82962 GLUCOSE BLOOD TEST: CPT

## 2017-10-13 PROCEDURE — 88311 DECALCIFY TISSUE: CPT | Performed by: OTOLARYNGOLOGY

## 2017-10-13 PROCEDURE — 25010000002 PROPOFOL 10 MG/ML EMULSION: Performed by: NURSE ANESTHETIST, CERTIFIED REGISTERED

## 2017-10-13 PROCEDURE — 88304 TISSUE EXAM BY PATHOLOGIST: CPT | Performed by: OTOLARYNGOLOGY

## 2017-10-13 RX ORDER — METOCLOPRAMIDE HYDROCHLORIDE 5 MG/ML
5 INJECTION INTRAMUSCULAR; INTRAVENOUS
Status: DISCONTINUED | OUTPATIENT
Start: 2017-10-13 | End: 2017-10-13 | Stop reason: HOSPADM

## 2017-10-13 RX ORDER — IPRATROPIUM BROMIDE AND ALBUTEROL SULFATE 2.5; .5 MG/3ML; MG/3ML
3 SOLUTION RESPIRATORY (INHALATION) ONCE AS NEEDED
Status: DISCONTINUED | OUTPATIENT
Start: 2017-10-13 | End: 2017-10-13 | Stop reason: HOSPADM

## 2017-10-13 RX ORDER — HYDROCODONE BITARTRATE AND ACETAMINOPHEN 5; 325 MG/1; MG/1
1 TABLET ORAL ONCE AS NEEDED
Status: DISCONTINUED | OUTPATIENT
Start: 2017-10-13 | End: 2017-10-13 | Stop reason: HOSPADM

## 2017-10-13 RX ORDER — LABETALOL HYDROCHLORIDE 5 MG/ML
5 INJECTION, SOLUTION INTRAVENOUS
Status: DISCONTINUED | OUTPATIENT
Start: 2017-10-13 | End: 2017-10-13 | Stop reason: HOSPADM

## 2017-10-13 RX ORDER — OXYMETAZOLINE HYDROCHLORIDE 0.05 G/100ML
2 SPRAY NASAL
Status: COMPLETED | OUTPATIENT
Start: 2017-10-13 | End: 2017-10-13

## 2017-10-13 RX ORDER — ALBUTEROL SULFATE 90 UG/1
2 AEROSOL, METERED RESPIRATORY (INHALATION) EVERY 4 HOURS PRN
COMMUNITY

## 2017-10-13 RX ORDER — SODIUM CHLORIDE, SODIUM LACTATE, POTASSIUM CHLORIDE, CALCIUM CHLORIDE 600; 310; 30; 20 MG/100ML; MG/100ML; MG/100ML; MG/100ML
100 INJECTION, SOLUTION INTRAVENOUS CONTINUOUS
Status: DISCONTINUED | OUTPATIENT
Start: 2017-10-13 | End: 2017-10-13 | Stop reason: HOSPADM

## 2017-10-13 RX ORDER — FENTANYL CITRATE 50 UG/ML
INJECTION, SOLUTION INTRAMUSCULAR; INTRAVENOUS AS NEEDED
Status: DISCONTINUED | OUTPATIENT
Start: 2017-10-13 | End: 2017-10-13 | Stop reason: SURG

## 2017-10-13 RX ORDER — SUCCINYLCHOLINE CHLORIDE 20 MG/ML
INJECTION INTRAMUSCULAR; INTRAVENOUS AS NEEDED
Status: DISCONTINUED | OUTPATIENT
Start: 2017-10-13 | End: 2017-10-13 | Stop reason: SURG

## 2017-10-13 RX ORDER — LIDOCAINE HYDROCHLORIDE 20 MG/ML
INJECTION, SOLUTION INFILTRATION; PERINEURAL AS NEEDED
Status: DISCONTINUED | OUTPATIENT
Start: 2017-10-13 | End: 2017-10-13 | Stop reason: SURG

## 2017-10-13 RX ORDER — NALOXONE HCL 0.4 MG/ML
0.04 VIAL (ML) INJECTION AS NEEDED
Status: DISCONTINUED | OUTPATIENT
Start: 2017-10-13 | End: 2017-10-13 | Stop reason: HOSPADM

## 2017-10-13 RX ORDER — VASOPRESSIN 20 U/ML
INJECTION PARENTERAL AS NEEDED
Status: DISCONTINUED | OUTPATIENT
Start: 2017-10-13 | End: 2017-10-13 | Stop reason: SURG

## 2017-10-13 RX ORDER — MAGNESIUM HYDROXIDE 1200 MG/15ML
LIQUID ORAL AS NEEDED
Status: DISCONTINUED | OUTPATIENT
Start: 2017-10-13 | End: 2017-10-13 | Stop reason: HOSPADM

## 2017-10-13 RX ORDER — ONDANSETRON 2 MG/ML
INJECTION INTRAMUSCULAR; INTRAVENOUS AS NEEDED
Status: DISCONTINUED | OUTPATIENT
Start: 2017-10-13 | End: 2017-10-13 | Stop reason: SURG

## 2017-10-13 RX ORDER — SODIUM CHLORIDE, SODIUM LACTATE, POTASSIUM CHLORIDE, CALCIUM CHLORIDE 600; 310; 30; 20 MG/100ML; MG/100ML; MG/100ML; MG/100ML
1000 INJECTION, SOLUTION INTRAVENOUS CONTINUOUS
Status: DISCONTINUED | OUTPATIENT
Start: 2017-10-13 | End: 2017-10-13 | Stop reason: HOSPADM

## 2017-10-13 RX ORDER — PROPOFOL 10 MG/ML
VIAL (ML) INTRAVENOUS AS NEEDED
Status: DISCONTINUED | OUTPATIENT
Start: 2017-10-13 | End: 2017-10-13 | Stop reason: SURG

## 2017-10-13 RX ORDER — HYDRALAZINE HYDROCHLORIDE 20 MG/ML
5 INJECTION INTRAMUSCULAR; INTRAVENOUS
Status: DISCONTINUED | OUTPATIENT
Start: 2017-10-13 | End: 2017-10-13 | Stop reason: HOSPADM

## 2017-10-13 RX ORDER — FLUMAZENIL 0.1 MG/ML
0.2 INJECTION INTRAVENOUS AS NEEDED
Status: DISCONTINUED | OUTPATIENT
Start: 2017-10-13 | End: 2017-10-13 | Stop reason: HOSPADM

## 2017-10-13 RX ORDER — SODIUM CHLORIDE 0.9 % (FLUSH) 0.9 %
1-10 SYRINGE (ML) INJECTION AS NEEDED
Status: DISCONTINUED | OUTPATIENT
Start: 2017-10-13 | End: 2017-10-13 | Stop reason: HOSPADM

## 2017-10-13 RX ORDER — ONDANSETRON 2 MG/ML
4 INJECTION INTRAMUSCULAR; INTRAVENOUS AS NEEDED
Status: DISCONTINUED | OUTPATIENT
Start: 2017-10-13 | End: 2017-10-13 | Stop reason: HOSPADM

## 2017-10-13 RX ORDER — HYDROCODONE BITARTRATE AND ACETAMINOPHEN 5; 325 MG/1; MG/1
1-2 TABLET ORAL EVERY 4 HOURS PRN
Qty: 8 TABLET | Refills: 0 | Status: SHIPPED | OUTPATIENT
Start: 2017-10-13 | End: 2017-10-21

## 2017-10-13 RX ORDER — SODIUM CHLORIDE 0.9 % (FLUSH) 0.9 %
3 SYRINGE (ML) INJECTION AS NEEDED
Status: DISCONTINUED | OUTPATIENT
Start: 2017-10-13 | End: 2017-10-13 | Stop reason: HOSPADM

## 2017-10-13 RX ORDER — LIDOCAINE HYDROCHLORIDE AND EPINEPHRINE 10; 10 MG/ML; UG/ML
INJECTION, SOLUTION INFILTRATION; PERINEURAL AS NEEDED
Status: DISCONTINUED | OUTPATIENT
Start: 2017-10-13 | End: 2017-10-13 | Stop reason: HOSPADM

## 2017-10-13 RX ORDER — ROCURONIUM BROMIDE 10 MG/ML
INJECTION, SOLUTION INTRAVENOUS AS NEEDED
Status: DISCONTINUED | OUTPATIENT
Start: 2017-10-13 | End: 2017-10-13 | Stop reason: SURG

## 2017-10-13 RX ORDER — SULFAMETHOXAZOLE AND TRIMETHOPRIM 800; 160 MG/1; MG/1
1 TABLET ORAL 2 TIMES DAILY
Qty: 14 TABLET | Refills: 0 | Status: SHIPPED | OUTPATIENT
Start: 2017-10-13 | End: 2017-10-20

## 2017-10-13 RX ADMIN — LIDOCAINE HYDROCHLORIDE 80 MG: 20 INJECTION, SOLUTION INFILTRATION; PERINEURAL at 08:22

## 2017-10-13 RX ADMIN — OXYMETAZOLINE HYDROCHLORIDE 2 SPRAY: 5 SPRAY NASAL at 07:11

## 2017-10-13 RX ADMIN — ONDANSETRON HYDROCHLORIDE 4 MG: 2 SOLUTION INTRAMUSCULAR; INTRAVENOUS at 08:49

## 2017-10-13 RX ADMIN — VASOPRESSIN 0.25 UNITS: 20 INJECTION INTRAVENOUS at 08:47

## 2017-10-13 RX ADMIN — OXYMETAZOLINE HYDROCHLORIDE 2 SPRAY: 5 SPRAY NASAL at 07:06

## 2017-10-13 RX ADMIN — SUCCINYLCHOLINE CHLORIDE 160 MG: 20 INJECTION, SOLUTION INTRAMUSCULAR; INTRAVENOUS at 08:22

## 2017-10-13 RX ADMIN — FENTANYL CITRATE 100 MCG: 50 INJECTION INTRAMUSCULAR; INTRAVENOUS at 08:22

## 2017-10-13 RX ADMIN — LIDOCAINE HYDROCHLORIDE 0.5 ML: 10 INJECTION, SOLUTION EPIDURAL; INFILTRATION; INTRACAUDAL; PERINEURAL at 06:10

## 2017-10-13 RX ADMIN — ROCURONIUM BROMIDE 5 MG: 10 INJECTION INTRAVENOUS at 08:22

## 2017-10-13 RX ADMIN — PROPOFOL 200 MG: 10 INJECTION, EMULSION INTRAVENOUS at 08:22

## 2017-10-13 RX ADMIN — OXYMETAZOLINE HYDROCHLORIDE 2 SPRAY: 5 SPRAY NASAL at 07:01

## 2017-10-13 RX ADMIN — FENTANYL CITRATE 50 MCG: 50 INJECTION INTRAMUSCULAR; INTRAVENOUS at 08:28

## 2017-10-13 RX ADMIN — SODIUM CHLORIDE, POTASSIUM CHLORIDE, SODIUM LACTATE AND CALCIUM CHLORIDE 1000 ML: 600; 310; 30; 20 INJECTION, SOLUTION INTRAVENOUS at 06:10

## 2017-10-13 NOTE — OP NOTE
"OPERATIVE NOTE  10/13/2017    NAME: Dada Tucker    YOB: 1960  MRN: [id number]    PRE-OPERATIVE DIAGNOSIS:    Nasal septal deviation [J34.2]  Hypertrophy of both inferior nasal turbinates [J34.3]   History of obstructive sleep apnea    POST-OPERATIVE DIAGNOSIS:   Same    PROCEDURE PERFORMED:   Septoplasty  Bilateral inferior turbinate reduction via Coblation    SURGEON:   Cedrick Carolina MD    ASSISTANT(S):   None    ANESTHESIA:   General Anesthesia via Endotracheal Tube    INDICATIONS: The patient is a 57 y.o. male with Nasal septal deviation [J34.2]  Hypertrophy of both inferior nasal turbinates [J34.3]    PROCEDURE:  The patient was brought to the operating room, given General Anesthesia via Endotracheal Tube, and prepped and draped in the usual manner.     Approximately 4 mL of 4% cocaine solution impregnating Codman neurosurgical pledgets were placed intranasally and 5 minutes allowed to pass by the clock.  The pledgets were removed and a hemitransfixion incision accomplished 12 mm cephalad to the caudal margin of the quadrangular cartilage.  Septal flaps were elevated bilaterally consisting of the mucoperichondrium.  The deviated portion of quadrangular cartilage was resected with care taken to leave 10 mm of quadrangular cartilage for dorsal support.  The dissection progressed posteriorly to the perpendicular plate of the ethmoid bone and a cartilaginous spur was removed with Reece forceps and a Rayle elevator on the left.  Subsequently the caudal incision was closed with interrupted 4-0 plain gut and the ENTrigue surgical stapler utilized to place \"whip\" stitches.     Inferior turbinate reduction was accomplished with the Coblator on the left through a single penetration via the anterior portion of the inferior turbinate and 3 lesions were created with the Coblator as the Coblator wand was gently withdrawn following full insertion.  No significant bleeding was encountered.  Attention " was then turned to the right with Coblation was performed of the right in a similar fashion with similar findings.    The patient tolerated the procedure well without complications and was transported to the postanesthesia care unit in stable condition.    SPECIMENS:    ID Type Source Tests Collected by Time Destination   A : septal bone Tissue Nose TISSUE EXAM Cedrick Carolina MD 10/13/2017 0727        COMPLICATIONS: NONE    ESTIMATED BLOOD LOSS:  Minimal    Cedrick Carolina MD  10/13/2017

## 2017-10-13 NOTE — ANESTHESIA POSTPROCEDURE EVALUATION
Patient: Dada Tucker    Procedure Summary     Date Anesthesia Start Anesthesia Stop Room / Location    10/13/17 0815 0917  [facility] 02 /  [facility]       Procedure Diagnosis Surgeon Provider    SEPTOPLASTY, RESECTION INFERIOR TURBINATES viaa coblation (N/A Nose) Nasal septal deviation; Hypertrophy of both inferior nasal turbinates  (Nasal septal deviation [J34.2]; Hypertrophy of both inferior nasal turbinates [J34.3]) MD Tamara Leyva CRNA          Anesthesia Type: general  Last vitals  BP        Temp        Pulse       Resp        SpO2          Post Anesthesia Care and Evaluation      Comments: Patient d/c from PACU prior to anes eval based on Vane score.  Please see RN notes for details of d/c criteria.

## 2017-10-13 NOTE — PLAN OF CARE
Problem: Patient Care Overview (Adult)  Goal: Plan of Care Review  Outcome: Outcome(s) achieved Date Met:  10/13/17    10/13/17 1112   Coping/Psychosocial Response Interventions   Plan Of Care Reviewed With patient;spouse   Patient Care Overview   Progress improving   Outcome Evaluation   Outcome Summary/Follow up Plan PT READY FOR DC HOME         Problem: Perioperative Period (Adult)  Goal: Signs and Symptoms of Listed Potential Problems Will be Absent or Manageable (Perioperative Period)  Outcome: Outcome(s) achieved Date Met:  10/13/17    10/13/17 1112   Perioperative Period   Problems Assessed (Perioperative Period) all   Problems Present (Perioperative Period) none

## 2017-10-13 NOTE — PLAN OF CARE
Problem: Perioperative Period (Adult)  Goal: Signs and Symptoms of Listed Potential Problems Will be Absent or Manageable (Perioperative Period)  Outcome: Ongoing (interventions implemented as appropriate)    10/13/17 0768   Perioperative Period   Problems Assessed (Perioperative Period) pain;hypothermia;hypoxia/hypoxemia;perioperative injury;situational response   Problems Present (Perioperative Period) situational response;pain

## 2017-10-13 NOTE — ANESTHESIA PREPROCEDURE EVALUATION
Anesthesia Evaluation     Patient summary reviewed and Nursing notes reviewed   no history of anesthetic complications:  NPO Solid Status: > 8 hours  NPO Liquid Status: > 8 hours     Airway   Mallampati: III  TM distance: >3 FB  Neck ROM: full  possible difficult intubation  Dental          Pulmonary     breath sounds clear to auscultation  (+) COPD, sleep apnea on CPAP,   (-) not a smoker  Cardiovascular   Exercise tolerance: poor (<4 METS)    ECG reviewed  Patient on routine beta blocker and Beta blocker given within 24 hours of surgery  Rhythm: regular  Rate: normal    (+) hypertension, hyperlipidemia  (-) CAD, angina      Neuro/Psych  (+) numbness (peripheral neuropathy), psychiatric history Depression,    (-) seizures, TIA, CVA  GI/Hepatic/Renal/Endo    (+) morbid obesity, GERD, diabetes mellitus type 2 using insulin,   (-) liver disease, no renal disease    Musculoskeletal         ROS comment: Rheumatoid arthritis  Abdominal    Substance History      OB/GYN          Other   (+) arthritis                                 Anesthesia Plan    ASA 3     general   (Pt reports awareness during anesthesia- unclear whether having sedation or GA. Has very severe KHALIF, plan to minimize sedatives, avoid versed. Having outpatient surgery. Discussed risk of longer post op monitoring.)  intravenous induction   Anesthetic plan and risks discussed with patient.

## 2017-10-13 NOTE — ANESTHESIA PROCEDURE NOTES
Airway  Urgency: elective    Airway not difficult    General Information and Staff    Patient location during procedure: OR  CRNA: SHAHRIAR CHAN    Indications and Patient Condition  Indications for airway management: airway protection    Preoxygenated: yes  Mask difficulty assessment: 0 - not attempted    Final Airway Details  Final airway type: endotracheal airway      Successful airway: ETT  Cuffed: yes   Successful intubation technique: video laryngoscopy  Facilitating devices/methods: intubating stylet and cricoid pressure  Endotracheal tube insertion site: oral  Blade: Lit  Blade size: 3.5.  ETT size: 7.5 mm  Cormack-Lehane Classification: grade I - full view of glottis  Placement verified by: chest auscultation and capnometry   Cuff volume (mL): 8  Measured from: lips  ETT to lips (cm): 23  Number of attempts at approach: 1    Additional Comments  RSI with cricoid pressure. Grade 1 view with Glidescope. Small mouth opening with large tongue with little room to pass ETT into mouth. Glidescope stylet used and ETT placed atraumatically.

## 2017-10-13 NOTE — DISCHARGE INSTRUCTIONS

## 2017-10-13 NOTE — PLAN OF CARE
Problem: Patient Care Overview (Adult)  Goal: Plan of Care Review  Outcome: Ongoing (interventions implemented as appropriate)    10/13/17 0945   Coping/Psychosocial Response Interventions   Plan Of Care Reviewed With patient   Patient Care Overview   Progress improving   Outcome Evaluation   Outcome Summary/Follow up Plan d/c criteria met         Problem: Perioperative Period (Adult)  Goal: Signs and Symptoms of Listed Potential Problems Will be Absent or Manageable (Perioperative Period)  Outcome: Ongoing (interventions implemented as appropriate)

## 2017-10-13 NOTE — ADDENDUM NOTE
Addendum  created 10/13/17 1325 by Tamara Gannon, YEIMY    Anesthesia Event edited, Anesthesia Intra Flowsheets edited, Procedure Event Log accessed

## 2017-10-16 LAB
CYTO UR: NORMAL
LAB AP CASE REPORT: NORMAL
Lab: NORMAL
PATH REPORT.FINAL DX SPEC: NORMAL
PATH REPORT.GROSS SPEC: NORMAL

## 2017-10-26 ENCOUNTER — OFFICE VISIT (OUTPATIENT)
Dept: OTOLARYNGOLOGY | Facility: CLINIC | Age: 57
End: 2017-10-26

## 2017-10-26 VITALS — HEIGHT: 76 IN | BODY MASS INDEX: 38.36 KG/M2 | WEIGHT: 315 LBS

## 2017-10-26 DIAGNOSIS — J34.2 NASAL SEPTAL DEVIATION: Primary | ICD-10-CM

## 2017-10-26 DIAGNOSIS — G47.33 OSA (OBSTRUCTIVE SLEEP APNEA): ICD-10-CM

## 2017-10-26 DIAGNOSIS — J34.89 NASAL VALVE COLLAPSE: ICD-10-CM

## 2017-10-26 DIAGNOSIS — J34.3 HYPERTROPHY OF BOTH INFERIOR NASAL TURBINATES: ICD-10-CM

## 2017-10-26 PROCEDURE — 31237 NSL/SINS NDSC SURG BX POLYPC: CPT | Performed by: NURSE PRACTITIONER

## 2017-10-26 NOTE — PROGRESS NOTES
Dada Tucker presents for a first postoperative visit following septoplasty and bilateral ITR on 10/13/17.     Subjective: Since surgery, he is doing well without complaints.  Symptoms denied postoperatively include fever, chills, bleeding and drainage. The patient states the pain has decreased since surgery. He reports he is breathing well postoperatively.         Objective:  Physical Exam   Constitutional: He is oriented to person, place, and time. He appears well-developed and well-nourished. He is cooperative. No distress.   HENT:   Head: Normocephalic and atraumatic.   Right Ear: External ear normal.   Left Ear: External ear normal.   Nose: No septal deviation or nasal septal hematoma.   Normal postoperative crusting present. Decongested and suctioned. Septum midline without perforation or hematoma.    Neck: Phonation normal.   Pulmonary/Chest: Effort normal. No stridor. No respiratory distress.   Neurological: He is alert and oriented to person, place, and time. He has normal strength. No cranial nerve deficit.   Skin: Skin is warm and dry.   Psychiatric: He has a normal mood and affect. His speech is normal and behavior is normal.       PROCEDURE NOTE      PROCEDURE:    Nasal Endoscopy  ANESTHESIA:  Topical Ponticaine and Afrin Spray   REASON FOR THE PROCEDURE:  Patient is status post recent nasal surgery with a need for evaluation of the surgical site and possible nasal debridement. The patient consented to operative intervention after a full discussion of risks, benefits, and alternatives. No guarantees were made or implied.   DETAILS OF THE OPERATION:  The patient was seated in the exam room chair. Nasal endoscopy with debridement was performed with the 0 degree scope through the nares after applying nebulized ponticaine/ Afrin spray . A 0 degree endoscope was introduced into the nasal cavity and gently directed to the deeper nasal cavity examining the following structures.   FINDINGS:  Mucosal  Surfaces:  The mucosal surfaces demonstrated crusting and post surgical changes.   Nasal Septum:  The nasal septum was found to have normal mucosa and no significant deviation.   Inferior Turbinates:  The inferior turbinates were found to be resected.     Bilateral debridement was performed using the endoscope with suctioning.       Assessment:  Dada was seen today for post-op.    Diagnoses and all orders for this visit:    Nasal septal deviation    Hypertrophy of both inferior nasal turbinates    KHALIF (obstructive sleep apnea)    Nasal valve collapse      Plan:    Protect the nose from trauma.  Nasal saline sprays each nostril every 2 hours as needed for crusting. Call for any problems or worsening symptoms. Follow-up in 4-6 weeks.     There are no Patient Instructions on file for this visit.    Return in about 4 weeks (around 11/23/2017) for Recheck.    Ruby Harry, JANIE  10/26/17  12:12 PM

## 2017-12-14 ENCOUNTER — OFFICE VISIT (OUTPATIENT)
Dept: OTOLARYNGOLOGY | Facility: CLINIC | Age: 57
End: 2017-12-14

## 2017-12-14 VITALS
RESPIRATION RATE: 20 BRPM | HEIGHT: 76 IN | TEMPERATURE: 97.8 F | BODY MASS INDEX: 38.36 KG/M2 | SYSTOLIC BLOOD PRESSURE: 123 MMHG | WEIGHT: 315 LBS | DIASTOLIC BLOOD PRESSURE: 69 MMHG | HEART RATE: 60 BPM

## 2017-12-14 DIAGNOSIS — Z98.890 S/P NASAL SEPTOPLASTY: ICD-10-CM

## 2017-12-14 DIAGNOSIS — J30.9 ALLERGIC RHINITIS, UNSPECIFIED CHRONICITY, UNSPECIFIED SEASONALITY, UNSPECIFIED TRIGGER: ICD-10-CM

## 2017-12-14 DIAGNOSIS — G47.33 OSA (OBSTRUCTIVE SLEEP APNEA): Primary | ICD-10-CM

## 2017-12-14 PROCEDURE — 99024 POSTOP FOLLOW-UP VISIT: CPT | Performed by: NURSE PRACTITIONER

## 2017-12-14 RX ORDER — CETIRIZINE HYDROCHLORIDE 10 MG/1
10 TABLET ORAL DAILY
Qty: 30 TABLET | Refills: 6 | Status: SHIPPED | OUTPATIENT
Start: 2017-12-14 | End: 2018-01-13

## 2017-12-19 NOTE — PROGRESS NOTES
PRIMARY CARE PROVIDER: Gigi Grullon MD  REFERRING PROVIDER: No ref. provider found    Chief Complaint   Patient presents with   • Follow-up     Septoplasty bilateral ITR       Subjective   History of Present Illness:  Dada Tucker is a  57 y.o. male  who presents for follow up s/p bilateral ITR and nasal septoplasty on October 13, 2017. The patient has had a relatively normal postoperative course. The patient has had complaints of itchy nose. The symptoms are localized to both nasal cavities. He has had moderate symptoms. The symptoms have been present for the last several weeks There have been no identified factors that aggravate the symptoms. There have been no factors that have improved the symptoms.  He reports he is breathing very well postoperatively and is no longer experiencing the nasal valve collapse with inspiration.  He has tried nasal steroids sprays in the past and states he cannot tolerate these because they make him feel depressed.    Review of Systems:  Review of Systems   Constitutional: Negative for chills and fever.   HENT: Positive for congestion. Negative for ear discharge, ear pain, postnasal drip, sinus pain, sinus pressure and voice change.    Musculoskeletal: Positive for arthralgias.   Allergic/Immunologic: Positive for environmental allergies.   All other systems reviewed and are negative.      Past History:  Past Medical History:   Diagnosis Date   • Allergic rhinitis    • Allergic urticaria    • Arthritis    • Cervical stenosis of spine    • COPD (chronic obstructive pulmonary disease)    • Dysthymia    • GERD (gastroesophageal reflux disease)    • Hyperlipidemia    • Hypertension    • Hypogonadism male    • Idiopathic peripheral neuropathy    • Narcolepsy    • KHALIF (obstructive sleep apnea)    • Psoriasis    • Psoriatic arthritis    • PTSD (post-traumatic stress disorder)    • Recurrent major depressive disorder    • Restless leg    • Rheumatoid arthritis    • Type 2 diabetes mellitus     • Vitamin B 12 deficiency    • Vitamin D deficiency      Past Surgical History:   Procedure Laterality Date   • CARPAL TUNNEL RELEASE  2005   • HEMORROIDECTOMY  2001   • RHINOPLASTY  2003   • SEPTOPLASTY, RESECTION INFERIOR TURBINATES N/A 10/13/2017    Procedure: SEPTOPLASTY, RESECTION INFERIOR TURBINATES viaa coblation;  Surgeon: Cedrick Carolina MD;  Location: [facility] OR;  Service:    • UVULOPALATOPHARYNGOPLASTY  2003     Family History   Problem Relation Age of Onset   • Diabetes Mother    • Diabetes Father    • Hypertension Father    • Stroke Father    • Alcohol abuse Brother    • Drug abuse Brother      Social History   Substance Use Topics   • Smoking status: Never Smoker   • Smokeless tobacco: Never Used   • Alcohol use No     Allergies:  Ampicillin; Ciprofloxacin; Levaquin [levofloxacin]; Penicillins; Atorvastatin; Crestor [rosuvastatin calcium]; Flonase [fluticasone propionate]; Fluvastatin; Gemfibrozil; Niacin and related; Pravastatin; Simvastatin; and Yellow dyes (non-tartrazine)    Current Outpatient Prescriptions:   •  albuterol (PROVENTIL HFA;VENTOLIN HFA) 108 (90 Base) MCG/ACT inhaler, Inhale 2 puffs Every 4 (Four) Hours As Needed for Wheezing., Disp: , Rfl:   •  aspirin 81 MG EC tablet, Take 81 mg by mouth Daily., Disp: , Rfl:   •  baclofen (LIORESAL) 10 MG tablet, Take 10 mg by mouth As Needed for Muscle Spasms., Disp: , Rfl:   •  bisoprolol (ZEBeta) 5 MG tablet, Take 5 mg by mouth Every Night., Disp: , Rfl:   •  Cholecalciferol (VITAMIN D3) 3000 units tablet, Take 2,000 mg by mouth Every Night., Disp: , Rfl:   •  Cholecalciferol 1000 units capsule, Take 1 tablet by mouth., Disp: , Rfl:   •  FLUoxetine (PROzac) 20 MG capsule, Take 40 mg by mouth Daily. 40 mg at lunch, Disp: , Rfl:   •  gabapentin (NEURONTIN) 800 MG tablet, Take 1,200 mg by mouth 3 (Three) Times a Day., Disp: , Rfl:   •  hydrochlorothiazide (HYDRODIURIL) 25 MG tablet, Take 25 mg by mouth Daily., Disp: , Rfl:   •  insulin glargine  "(LANTUS) 100 UNIT/ML injection, Inject 20 Units under the skin Daily With Lunch., Disp: , Rfl:   •  lisinopril (PRINIVIL,ZESTRIL) 40 MG tablet, Take 40 mg by mouth Daily. 20mg at lunch, Disp: , Rfl:   •  metFORMIN (GLUCOPHAGE) 1000 MG tablet, Take 1,000 mg by mouth 2 (Two) Times a Day With Meals., Disp: , Rfl:   •  modafinil (PROVIGIL) 100 MG tablet, Take 100 mg by mouth Daily., Disp: , Rfl:   •  montelukast (SINGULAIR) 10 MG tablet, Take 10 mg by mouth Every Night., Disp: , Rfl:   •  NIFEdipine XL (PROCARDIA XL) 30 MG 24 hr tablet, Take 30 mg by mouth Daily., Disp: , Rfl:   •  pantoprazole (PROTONIX) 40 MG EC tablet, Take 40 mg by mouth 2 (Two) Times a Day., Disp: , Rfl:   •  SAXagliptin (ONGLYZA) 5 MG tablet, Take 5 mg by mouth Daily., Disp: , Rfl:   •  sucralfate (CARAFATE) 1 G tablet, Take 1 g by mouth 2 (Two) Times a Day., Disp: , Rfl:   •  vitamin B-12 (CYANOCOBALAMIN) 1000 MCG tablet, Take 1,000 mcg by mouth Daily., Disp: , Rfl:   •  cetirizine (zyrTEC) 10 MG tablet, Take 1 tablet by mouth Daily for 30 days. 1 by mouth every day as needed for allergy symptoms, Disp: 30 tablet, Rfl: 6      Objective     Vital Signs:    /69  Pulse 60  Temp 97.8 °F (36.6 °C)  Resp 20  Ht 193 cm (76\")  Wt (!) 145 kg (319 lb)  BMI 38.83 kg/m2    Physical Exam:  Physical Exam  CONSTITUTIONAL: well nourished, well-developed, alert, oriented, in no acute distress   COMMUNICATION AND VOICE: able to communicate normally, normal voice quality  HEAD: normocephalic, no lesions, atraumatic, no tenderness, no masses   FACE: appearance normal, no lesions, no tenderness, no deformities, facial motion symmetric  SALIVARY GLANDS: parotid glands with no tenderness, no swelling, no masses, submandibular glands with normal size, nontender  EYES: ocular motility normal, eyelids normal, orbits normal, no proptosis, conjunctiva normal , pupils equal, round   EARS:  Hearing: response to conversational voice normal bilaterally   External " Ears: auricles without lesions  NOSE:  External Nose: structure normal, no tenderness on palpation, no nasal discharge, no lesions, no evidence of trauma, nostrils patent   Intranasal Exam: nasal mucosa with inflammation, vestibule within normal limits, inferior turbinate resected, nasal septum midline without perforation  ORAL:  Lips: upper and lower lips without lesion   NECK: neck appearance normal, no masses or tenderness   LYMPH NODES: no lymphadenopathy  CHEST/RESPIRATORY: respiratory effort normal, normal breath sounds   CARDIOVASCULAR: rate and rhythm normal, extremities without cyanosis or edema    NEUROLOGIC/PSYCHIATRIC: oriented to time, place and person, mood normal, affect appropriate, CN II-XII intact grossly      Assessment   Assessment:  1. KHALIF (obstructive sleep apnea)    2. Allergic rhinitis, unspecified chronicity, unspecified seasonality, unspecified trigger    3. S/P nasal septoplasty        Plan   Plan:    New Medications Ordered This Visit   Medications   • cetirizine (zyrTEC) 10 MG tablet     Sig: Take 1 tablet by mouth Daily for 30 days. 1 by mouth every day as needed for allergy symptoms     Dispense:  30 tablet     Refill:  6     Start zyrtec. He prefers not to use any nasal sprays. May continue saline nasal spray PRN. Call for any problems or worsening symptoms.     Return in about 3 months (around 3/14/2018).    My findings and recommendations were discussed and questions were answered.     Ruby Harry, APRN

## 2018-02-01 ENCOUNTER — OFFICE VISIT (OUTPATIENT)
Dept: OTOLARYNGOLOGY | Facility: CLINIC | Age: 58
End: 2018-02-01

## 2018-02-01 VITALS
HEIGHT: 76 IN | BODY MASS INDEX: 38.36 KG/M2 | TEMPERATURE: 96.8 F | RESPIRATION RATE: 18 BRPM | HEART RATE: 55 BPM | DIASTOLIC BLOOD PRESSURE: 85 MMHG | OXYGEN SATURATION: 98 % | WEIGHT: 315 LBS | SYSTOLIC BLOOD PRESSURE: 164 MMHG

## 2018-02-01 DIAGNOSIS — J34.2 NASAL SEPTAL DEVIATION: ICD-10-CM

## 2018-02-01 DIAGNOSIS — J34.3 HYPERTROPHY OF BOTH INFERIOR NASAL TURBINATES: ICD-10-CM

## 2018-02-01 DIAGNOSIS — G47.33 OSA (OBSTRUCTIVE SLEEP APNEA): Primary | ICD-10-CM

## 2018-02-01 DIAGNOSIS — J34.89 NASAL VALVE COLLAPSE: ICD-10-CM

## 2018-02-01 PROBLEM — M95.0 NASAL VALVE COLLAPSE: Status: RESOLVED | Noted: 2017-05-25 | Resolved: 2018-02-01

## 2018-02-01 PROBLEM — J34.829 NASAL VALVE COLLAPSE: Status: RESOLVED | Noted: 2017-05-25 | Resolved: 2018-02-01

## 2018-02-01 PROCEDURE — 99213 OFFICE O/P EST LOW 20 MIN: CPT | Performed by: PHYSICIAN ASSISTANT

## 2018-02-01 NOTE — PROGRESS NOTES
YOB: 1960  Location: [facility]  Location Address: [address] [place] [address], [facility], [address] [place] [address]  Location Phone: [phone]    Chief Complaint   Patient presents with   • Follow-up     surgery patient states of getting better / light pain on right side        History of Present Illness  Dada Tucker is a 57 y.o. male.  Dada Tucker is here for follow up of ENT complaints. The patient is status post sinus surgery. He reports he is doing well with only mild tenderness on the right side. No other problems at this time.      Past Medical History:   Diagnosis Date   • Allergic rhinitis    • Allergic urticaria    • Arthritis    • Cervical stenosis of spine    • COPD (chronic obstructive pulmonary disease)    • Dysthymia    • GERD (gastroesophageal reflux disease)    • Hyperlipidemia    • Hypertension    • Hypogonadism male    • Idiopathic peripheral neuropathy    • Narcolepsy    • KHALIF (obstructive sleep apnea)    • Psoriasis    • Psoriatic arthritis    • PTSD (post-traumatic stress disorder)    • Recurrent major depressive disorder    • Restless leg    • Rheumatoid arthritis    • Type 2 diabetes mellitus    • Vitamin B 12 deficiency    • Vitamin D deficiency        Past Surgical History:   Procedure Laterality Date   • CARPAL TUNNEL RELEASE     • HEMORROIDECTOMY     • RHINOPLASTY     • SEPTOPLASTY, RESECTION INFERIOR TURBINATES N/A 10/13/2017    Procedure: SEPTOPLASTY, RESECTION INFERIOR TURBINATES viaa coblation;  Surgeon: Cedrick Carolina MD;  Location: [facility] OR;  Service:    • UVULOPALATOPHARYNGOPLASTY           Current Outpatient Prescriptions:   •  albuterol (PROVENTIL HFA;VENTOLIN HFA) 108 (90 Base) MCG/ACT inhaler, Inhale 2 puffs Every 4 (Four) Hours As Needed for Wheezing., Disp: , Rfl:   •  aspirin 81 MG EC tablet, Take 81 mg by mouth Daily., Disp: , Rfl:   •  baclofen (LIORESAL) 10 MG tablet, Take 10 mg by mouth As Needed for Muscle Spasms., Disp: , Rfl:   •   bisoprolol (ZEBeta) 5 MG tablet, Take 5 mg by mouth Every Night., Disp: , Rfl:   •  Cholecalciferol (VITAMIN D3) 3000 units tablet, Take 2,000 mg by mouth Every Night., Disp: , Rfl:   •  Cholecalciferol 1000 units capsule, Take 1 tablet by mouth., Disp: , Rfl:   •  FLUoxetine (PROzac) 20 MG capsule, Take 40 mg by mouth Daily. 40 mg at lunch, Disp: , Rfl:   •  gabapentin (NEURONTIN) 800 MG tablet, Take 1,200 mg by mouth 3 (Three) Times a Day., Disp: , Rfl:   •  hydrochlorothiazide (HYDRODIURIL) 25 MG tablet, Take 25 mg by mouth Daily., Disp: , Rfl:   •  insulin glargine (LANTUS) 100 UNIT/ML injection, Inject 20 Units under the skin Daily With Lunch., Disp: , Rfl:   •  lisinopril (PRINIVIL,ZESTRIL) 40 MG tablet, Take 40 mg by mouth Daily. 20mg at lunch, Disp: , Rfl:   •  metFORMIN (GLUCOPHAGE) 1000 MG tablet, Take 1,000 mg by mouth 2 (Two) Times a Day With Meals., Disp: , Rfl:   •  modafinil (PROVIGIL) 100 MG tablet, Take 100 mg by mouth Daily., Disp: , Rfl:   •  montelukast (SINGULAIR) 10 MG tablet, Take 10 mg by mouth Every Night., Disp: , Rfl:   •  NIFEdipine XL (PROCARDIA XL) 30 MG 24 hr tablet, Take 30 mg by mouth Daily., Disp: , Rfl:   •  pantoprazole (PROTONIX) 40 MG EC tablet, Take 40 mg by mouth 2 (Two) Times a Day., Disp: , Rfl:   •  SAXagliptin (ONGLYZA) 5 MG tablet, Take 5 mg by mouth Daily., Disp: , Rfl:   •  sucralfate (CARAFATE) 1 G tablet, Take 1 g by mouth 2 (Two) Times a Day., Disp: , Rfl:   •  vitamin B-12 (CYANOCOBALAMIN) 1000 MCG tablet, Take 1,000 mcg by mouth Daily., Disp: , Rfl:     Ampicillin; Ciprofloxacin; Levaquin [levofloxacin]; Penicillins; Atorvastatin; Crestor [rosuvastatin calcium]; Flonase [fluticasone propionate]; Fluvastatin; Gemfibrozil; Niacin and related; Pravastatin; Simvastatin; and Yellow dyes (non-tartrazine)    Family History   Problem Relation Age of Onset   • Diabetes Mother    • Diabetes Father    • Hypertension Father    • Stroke Father    • Alcohol abuse Brother    •  Drug abuse Brother        Social History     Social History   • Marital status:      Spouse name: N/A   • Number of children: N/A   • Years of education: N/A     Occupational History   • Not on file.     Social History Main Topics   • Smoking status: Never Smoker   • Smokeless tobacco: Never Used   • Alcohol use No   • Drug use: No   • Sexual activity: Defer     Other Topics Concern   • Not on file     Social History Narrative       Review of Systems   Constitutional: Negative for activity change, appetite change, chills, diaphoresis, fatigue, fever and unexpected weight change.   HENT: Negative for congestion, ear discharge, ear pain, facial swelling, hearing loss, mouth sores, nosebleeds, postnasal drip, rhinorrhea, sinus pressure, sneezing, sore throat, tinnitus, trouble swallowing and voice change.         Right sided nasal tenderness   Eyes: Negative for pain, discharge, redness, itching and visual disturbance.   Respiratory: Negative for apnea, cough, choking, chest tightness, shortness of breath, wheezing and stridor.    Gastrointestinal: Negative for nausea and vomiting.   Endocrine: Negative for cold intolerance and heat intolerance.   Musculoskeletal: Negative for arthralgias, back pain, gait problem, neck pain and neck stiffness.   Skin: Negative for rash.   Allergic/Immunologic: Negative for environmental allergies and food allergies.   Neurological: Negative for dizziness, tremors, seizures, syncope, facial asymmetry, speech difficulty, weakness, light-headedness, numbness and headaches.   Hematological: Negative for adenopathy. Does not bruise/bleed easily.   Psychiatric/Behavioral: Negative for behavioral problems, sleep disturbance and suicidal ideas. The patient is not nervous/anxious and is not hyperactive.        Vitals:    02/01/18 1333   BP: 164/85   Pulse: 55   Resp: 18   Temp: 96.8 °F (36 °C)   SpO2: 98%       Objective     Physical Exam  CONSTITUTIONAL: well nourished, alert, oriented,  in no acute distress     COMMUNICATION AND VOICE: able to communicate normally, normal voice quality    HEAD: normocephalic, no lesions, atraumatic, no tenderness, no masses     FACE: appearance normal, no lesions, no tenderness, no deformities, facial motion symmetric    EYES: ocular motility normal, eyelids normal, orbits normal, no proptosis, conjunctiva normal , pupils equal, round     EARS:  Hearing: response to conversational voice normal bilaterally   External Ears: auricles without lesions    NOSE:  External Nose: structure normal, no tenderness on palpation, no nasal discharge, no lesions, no evidence of trauma, nostrils patent   Intranasal Exam: nasal mucosa normal, vestibule within normal limits, inferior turbinate normal, nasal septum midline   Nasopharynx:     ORAL:  Lips: upper and lower lips without lesion     NECK: neck appearance normal    CHEST/RESPIRATORY: respiratory effort normal, normal breath sounds     CARDIOVASCULAR: rate and rhythm normal, extremities without cyanosis or edema      NEUROLOGIC/PSYCHIATRIC: oriented to time, place and person, mood normal, affect appropriate, CN II-XII intact grossly    Assessment/Plan   Problems Addressed this Visit        Respiratory    KHALIF (obstructive sleep apnea) - Primary    RESOLVED: Nasal valve collapse    RESOLVED: Hypertrophy of both inferior nasal turbinates    RESOLVED: Nasal septal deviation        * Surgery not found *  No orders of the defined types were placed in this encounter.    Return in about 6 months (around 8/1/2018) for Recheck sinuses.       Patient Instructions   Continue care as directed, follow-up as directed or sooner if symptoms develop.

## 2019-04-03 ENCOUNTER — TRANSCRIBE ORDERS (OUTPATIENT)
Dept: PHYSICAL THERAPY | Facility: HOSPITAL | Age: 59
End: 2019-04-03

## 2019-04-03 DIAGNOSIS — I89.0 LYMPHEDEMA: Primary | ICD-10-CM

## 2019-04-09 ENCOUNTER — APPOINTMENT (OUTPATIENT)
Dept: PHYSICAL THERAPY | Facility: HOSPITAL | Age: 59
End: 2019-04-09

## 2019-04-10 ENCOUNTER — HOSPITAL ENCOUNTER (OUTPATIENT)
Dept: PHYSICAL THERAPY | Facility: HOSPITAL | Age: 59
Setting detail: THERAPIES SERIES
Discharge: HOME | End: 2019-04-10

## 2019-04-10 DIAGNOSIS — I89.0 LYMPHEDEMA OF BOTH LOWER EXTREMITIES: Primary | ICD-10-CM

## 2019-04-10 PROCEDURE — 97163 PT EVAL HIGH COMPLEX 45 MIN: CPT | Performed by: PHYSICAL THERAPIST

## 2019-04-10 NOTE — THERAPY EVALUATION
Physical Therapy Lymphedema Initial Evaluation  [facility]     Patient Name: Dada Tucker  : 1960  MRN: [id number]  Today's Date: 4/10/2019      Visit Date: 04/10/2019    Visit Dx:    ICD-10-CM ICD-9-CM   1. Lymphedema of both lower extremities I89.0 457.1       Patient Active Problem List   Diagnosis   • KHALIF (obstructive sleep apnea)        Past Medical History:   Diagnosis Date   • Allergic rhinitis    • Allergic urticaria    • Arthritis    • Cervical stenosis of spine    • COPD (chronic obstructive pulmonary disease) (CMS/[facility])    • Dysthymia    • GERD (gastroesophageal reflux disease)    • Hyperlipidemia    • Hypertension    • Hypogonadism male    • Idiopathic peripheral neuropathy    • Narcolepsy    • KHALIF (obstructive sleep apnea)    • Psoriasis    • Psoriatic arthritis (CMS/[facility])    • PTSD (post-traumatic stress disorder)    • Recurrent major depressive disorder (CMS/[facility])    • Restless leg    • Rheumatoid arthritis (CMS/[facility])    • Type 2 diabetes mellitus (CMS/[facility])    • Vitamin B 12 deficiency    • Vitamin D deficiency         Past Surgical History:   Procedure Laterality Date   • CARPAL TUNNEL RELEASE     • HEMORROIDECTOMY     • RHINOPLASTY     • SEPTOPLASTY, RESECTION INFERIOR TURBINATES N/A 10/13/2017    Procedure: SEPTOPLASTY, RESECTION INFERIOR TURBINATES viaa coblation;  Surgeon: Cedrick Carolina MD;  Location: [facility];  Service:    • UVULOPALATOPHARYNGOPLASTY         Visit Dx:    ICD-10-CM ICD-9-CM   1. Lymphedema of both lower extremities I89.0 457.1       Patient History     Row Name 04/10/19 0900             History    Chief Complaint  Swelling;Ulcer, wound or other skin conditions  -HR      Date Current Problem(s) Began  04/10/15  -HR      Brief Description of Current Complaint  He reports he has been having trouble with swelling in his legs since  and there is always an open wound on either leg that constantly weeps. He is seeing a vascular surgeon in a couple  weeks and [facility] along with a hematologist.   -HR      Patient/Caregiver Goals  Heal wound;Decrease swelling;Know what to do to help the symptoms  -HR      Patient's Rating of General Health  Poor  -HR      Hand Dominance  right-handed  -HR      Occupation/sports/leisure activities  disabled- Was a Licensed Social Worker in [place]  -HR         Pain     Pain Location  Leg  -HR      Pain at Present  0  -HR      Pain Comments  He has severe neuropathy from knees down on both legs so he has no sense of pain. He does report that burning from his neuropathy is frequent at night but it affects both legs from the knees down.   -HR      Is your sleep disturbed?  No  -HR      Is medication used to assist with sleep?  No  -HR      Total hours of sleep per night  Sleeps multiple hours during the day as well as night  -HR         Fall Risk Assessment    Any falls in the past year:  Yes  -HR      Number of falls reported in the last 12 months  2  -HR      Factors that contributed to the fall:  Lost balance;Low light;Fatigue relies on vision for balance  -HR         Services    Prior Rehab/Home Health Experiences  No  -HR      Are you currently receiving Home Health services  No  -HR      Do you plan to receive Home Health services in the near future  No  -HR         Daily Activities    Primary Language  English  -HR      How does patient learn best?  Demonstration  -HR      Teaching needs identified  Management of Condition;Home Exercise Program  -HR      Does patient have problems with the following?  Depression;Anxiety  -HR      Barriers to learning  None  -HR         Safety    Are you being hurt, hit, or frightened by anyone at home or in your life?  No  -HR      Are you being neglected by a caregiver  No  -HR        User Key  (r) = Recorded By, (t) = Taken By, (c) = Cosigned By    Initials Name Provider Type    HR Chelsey Souza, PT, DPT, CLT-HAYDEE Physical Therapist          Lymphedema     Row Name 04/10/19  "0900             Subjective Pain    Able to rate subjective pain?  yes  -HR      Pre-Treatment Pain Level  0  -HR      Subjective Pain Comment  \"Can't feel anything from my neuropathy\"  -HR         Subjective Comments    Subjective Comments  He has been dealing with a lot of health problems since 2015.  -HR         Lymphedema Assessment    Lymphedema Classification  RLE:;LLE:;stage 3 (Lymphostatic Elephantiasis)  -HR      Infections or Cellulitis?  unspecified  -HR      Lymphedema Precautions  anemia;kidney disease;other (comment) Stage 3 Liver failure  -HR      Lymphedema Assessment Comments  Photos in Media section  -HR         Posture/Observations    Posture/Observations Comments  He has foot drop from neuropathy and uses AFOs but can't get them on now due to swelling in the legs.   -HR         Lymphedema Edema Assessment    Ptting Edema Category  By grade out of 3  -HR      Pitting Edema  + 1/3 (1 of 3)  -HR      Edema Assessment Comment  Posterior thighs have increased density and fibrosis but majority of edema is currently in the lower legs.   -HR         Skin Changes/Observations    Location/Assessment  Lower Extremity  -HR      Lower Extremity Conditions  bilateral:;dry;scaly;weeping;fragile  -HR      Lower Extremity Color/Pigment  bilateral:;erythema;red;fibrosis;hyperpigmented  -HR      Lower Extremity Skin Details  see photo  -HR      Skin Observations Comment  He has a spot on his R lower abdomen that could have been a spider bite that is slightly red and raised but it is better per patient since beginning clindamycine yesterday.   -HR         Lymphedema Sensation    Lymphedema Sensation Reports  RLE:;LLE:;other (comment) abnormal. peripheral neuropathy  -HR      Lymphedema Sensation Comments  reports neuropathy is due to chemical exposure as it was present years before he became diabetic  -HR         Lymphedema Measurements    Measurement Type(s)  Circumferential  -HR      Circumferential Areas  Lower " "extremities  -HR         BLE Circumferential (cm)    Measurement Location 1  BOT  -HR      Left 1  26.4 cm  -HR      Right 1  25.7 cm  -HR      Measurement Location 2  +10  -HR      Left 2  33.5 cm  -HR      Right 2  32 cm  -HR      Measurement Location 3  +10  -HR      Left 3  31.4 cm  -HR      Right 3  30.3 cm  -HR      Measurement Location 4  +10  -HR      Left 4  41.5 cm  -HR      Right 4  41 cm  -HR      Measurement Location 5  +10  -HR      Left 5  49.8 cm  -HR      Right 5  46.4 cm  -HR      Measurement Location 6  +10  -HR      Left 6  41 cm  -HR      Right 6  38.5 cm  -HR      Measurement Location 7  +10  -HR      Left 7  46.8 cm  -HR      Right 7  45.5 cm  -HR      Measurement Location 8  +10  -HR      Left 8  59 cm  -HR      Right 8  55.5 cm  -HR      LLE Circumferential Total  329.4 cm  -HR      RLE Circumferential Total  314.9 cm  -HR         Manual Lymphatic Drainage    Manual Lymphatic Drainage Comments  Discussed sequencing of MLD  -HR         Compression/Skin Care    Compression/Skin Care Comments  Showed him [employer] garment and he is going to look into getting these.   -HR        User Key  (r) = Recorded By, (t) = Taken By, (c) = Cosigned By    Initials Name Provider Type    Chelsey Rosario, PT, DPT, JESSICA Physical Therapist                    [unfilled]      Therapy Education  Given: Edema management  Program: New  How Provided: Verbal  Provided to: Patient  Level of Understanding: Verbalized      Exercises     Row Name 04/10/19 0900             Subjective Comments    Subjective Comments  He has been dealing with a lot of health problems since 2015.  -HR         Subjective Pain    Able to rate subjective pain?  yes  -HR      Pre-Treatment Pain Level  0  -HR      Subjective Pain Comment  \"Can't feel anything from my neuropathy\"  -HR        User Key  (r) = Recorded By, (t) = Taken By, (c) = Cosigned By    Initials Name Provider Type    Chelsey Rosario, PT, DPT, " JESSICA Physical Therapist                      PT OP Goals     Row Name 04/10/19 0900          PT Short Term Goals    STG Date to Achieve  04/24/19  -HR     STG 1  Patient will understand the importance of skin care with lymphedema.  -HR     STG 1 Progress  New  -HR     STG 2  Patient will be able to perform self MLD or have assistance of wife at home.   -HR     STG 2 Progress  New  -HR     STG 3  Patient will have appropriate compression garments for lower legs.   -HR     STG 3 Progress  New  -HR        Long Term Goals    LTG 1  Patient will have no open areas to lower legs.  -HR     LTG 1 Progress  New  -HR     LTG 2  Patient will be independent with a home lymphedema pump.  -HR     LTG 2 Progress  New  -HR     LTG 3  Patient will have appropriate compression for upper legs as indicated.   -HR     LTG 3 Progress  New  -HR     LTG 4  Patient will be independent with remedial HEP for lymphedema.  -HR     LTG 4 Progress  New  -HR        Time Calculation    PT Goal Re-Cert Due Date  05/10/19  -HR       User Key  (r) = Recorded By, (t) = Taken By, (c) = Cosigned By    Initials Name Provider Type    HR Chelsey Souza, PT, DPT, JESSICA Physical Therapist          PT Assessment/Plan     Row Name 04/10/19 0900          PT Assessment    Functional Limitations  Impaired gait;Decreased safety during functional activities;Limitations in functional capacity and performance  -HR     Impairments  Integumentary integrity;Edema;Impaired lymphatic circulation;Impaired venous circulation;Impaired sensory integrity;Impaired muscle power  -HR     Assessment Comments  Mr. Tucker has multiple conditions that have led to worsening BLE lymphedema and skin breakdown. He would benefit from MLD and compression so I was able to explain the process and give him some compression options to look into getting. He has open wounds on both lower legs that drain daily. We will focus on skin care along with MLD and compression. We will also  work with him on a good HEP as well. He would benefit from a Flexitouch pump to use at home as a maintenance tool as well. He is being seen by a hematologist, wound care and vascular surgeon over the next few weeks. We will take their recommendations into account as needed as well.   -HR     Please refer to paper survey for additional self-reported information  Yes  -HR     Rehab Potential  Fair  -HR     Patient/caregiver participated in establishment of treatment plan and goals  Yes  -HR     Patient would benefit from skilled therapy intervention  Yes  -HR        PT Plan    PT Frequency  1x/week;2x/week  -HR     Predicted Duration of Therapy Intervention (Therapy Eval)  6 week  -HR     Planned CPT's?  PT MANUAL THERAPY EA 15 MIN: 81134;PT THER PROC EA 15 MIN: 13935  -HR     PT Plan Comments  PT to see for CDT to BLEs.  -HR       User Key  (r) = Recorded By, (t) = Taken By, (c) = Cosigned By    Initials Name Provider Type    HR Chelsey Souza, PT, DPT, CLT-HAYDEE Physical Therapist                       Time Calculation:       Therapy Charges for Today     Code Description Service Date Service Provider Modifiers Qty    28795264598 HC PT EVAL HIGH COMPLEXITY 4 4/10/2019 Chelsey Souza, PT, DPT, CLT-HAYDEE GP 1    12134622747 HC PT THER SUPP EA 15 MIN 4/10/2019 Chelsey Souza, PT, DPT, CLT-HAYDEE GP 2                    Chelsey Souza, PT, DPT, CLT-HAYDEE  4/10/2019

## 2019-04-15 ENCOUNTER — HOSPITAL ENCOUNTER (OUTPATIENT)
Dept: PHYSICAL THERAPY | Facility: HOSPITAL | Age: 59
Setting detail: THERAPIES SERIES
Discharge: HOME | End: 2019-04-15

## 2019-04-15 DIAGNOSIS — I89.0 LYMPHEDEMA OF BOTH LOWER EXTREMITIES: Primary | ICD-10-CM

## 2019-04-15 PROCEDURE — 97140 MANUAL THERAPY 1/> REGIONS: CPT

## 2019-04-15 NOTE — THERAPY TREATMENT NOTE
Outpatient Physical Therapy Lymphedema Treatment Note   [place]     Patient Name: Dada Tucker  : 1960  MRN: [id number]  Today's Date: 4/15/2019        Visit Date: 04/15/2019    Visit Dx:    ICD-10-CM ICD-9-CM   1. Lymphedema of both lower extremities I89.0 457.1       Patient Active Problem List   Diagnosis   • KHALIF (obstructive sleep apnea)        Lymphedema     Row Name 04/15/19 1300             Subjective Pain    Able to rate subjective pain?  yes  -AL      Pre-Treatment Pain Level  4  -AL      Subjective Pain Comment  He is having shoulder and neck pain.  -AL         Subjective Comments    Subjective Comments  He has fatigue from lymphedema and neuropathy.  He has leg cramps daily.  He has ordered the [employer].  He did get a call from the pump company, he will call them back.   -AL         Manual Lymphatic Drainage    Manual Lymphatic Drainage  initial sequence;opened regional lymph nodes;opened anastamoses;extremity treatment  -AL      Initial Sequence  short neck;abdomen;diaphragmatic breathing  -AL      Abdomen  superficial  -AL      Diaphragmatic Breathing  x 9 with superficial abdominals  -AL      Opened Regional Lymph Nodes  axillary;inguinal  -AL      Axillary  right;left  -AL      Inguinal  left;right  -AL      Opened Anastamoses  inguino-axillary  -AL      Inguino-Axillary  right;left  -AL      Extremity Treatment  MLD to full limb  -AL      MLD to Full Limb  B LE's  -AL      Manual Lymphatic Drainage Comments  Instructed on HEP and Self MLD.  -AL         Compression/Skin Care    Compression/Skin Care  skin care  -AL      Skin Care  moisturizing lotion applied  -AL      Compression/Skin Care Comments  He has ordered the [employer] garments, they have not come in franca.   -AL        User Key  (r) = Recorded By, (t) = Taken By, (c) = Cosigned By    Initials Name Provider Type    Elisabeth Kyle PTA, CLT-LANA Physical Therapy Assistant                    [unfilled]      Assessment/Plan     Row Name 04/15/19 1300          PT Assessment    Assessment Comments  Patient does not have his [employer] Basic garments yet, they should be in soon.  He has been contacted by [email or web address], he will call them back today and hopefully set up a time for a demo for the Flexitouch.  He is going to work on the MLD and HEP.   -AL        PT Plan    PT Plan Comments  Will continue with POC.  -AL       User Key  (r) = Recorded By, (t) = Taken By, (c) = Cosigned By    Initials Name Provider Type    Elisabeth Kyle PTA, ALFONZO-HAYDEE Physical Therapy Assistant             Exercises     Row Name 04/15/19 1300             Subjective Comments    Subjective Comments  He has fatigue from lymphedema and neuropathy.  He has leg cramps daily.  He has ordered the [employer].  He did get a call from the pump company, he will call them back.   -AL         Subjective Pain    Able to rate subjective pain?  yes  -AL      Pre-Treatment Pain Level  4  -AL      Subjective Pain Comment  He is having shoulder and neck pain.  -AL        User Key  (r) = Recorded By, (t) = Taken By, (c) = Cosigned By    Initials Name Provider Type    Elisabeth Kyle PTA, CLT-HAYDEE Physical Therapy Assistant                      PT OP Goals     Row Name 04/15/19 1300          PT Short Term Goals    STG Date to Achieve  04/24/19  -AL     STG 1  Patient will understand the importance of skin care with lymphedema.  -AL     STG 1 Progress  Ongoing  -AL     STG 1 Progress Comments  Discussed during treatment  -AL     STG 2  Patient will be able to perform self MLD or have assistance of wife at home.   -AL     STG 2 Progress  Ongoing  -AL     STG 2 Progress Comments  Instructed on self MLD  -AL     STG 3  Patient will have appropriate compression garments for lower legs.   -AL     STG 3 Progress  Ongoing  -AL     STG 3 Progress Comments  He has ordered [employer] Basic garments  -AL        Long Term Goals    LTG 1  Patient will have no open areas to  lower legs.  -AL     LTG 1 Progress  Ongoing  -AL     LTG 1 Progress Comments  Still has open areas present on both lower legs  -AL     LTG 2  Patient will be independent with a home lymphedema pump.  -AL     LTG 2 Progress  Ongoing  -AL     LTG 2 Progress Comments  He has been contacted by [facility]  -AL     LTG 3  Patient will have appropriate compression for upper legs as indicated.   -AL     LTG 3 Progress  New  -AL     LTG 4  Patient will be independent with remedial HEP for lymphedema.  -AL     LTG 4 Progress  Ongoing  -AL     LTG 4 Progress Comments  Instructed on HEP  -AL       User Key  (r) = Recorded By, (t) = Taken By, (c) = Cosigned By    Initials Name Provider Type    AL Elisabeth Moreau PTA, CLT-LANA Physical Therapy Assistant          Therapy Education  Education Details: Work on HEP and MLD  Given: Edema management  Program: New  How Provided: Verbal, Demonstration, Written  Provided to: Patient  Level of Understanding: Verbalized, Demonstrated, Teach back education performed              Time Calculation:   Start Time: 1300  Stop Time: 1413  Time Calculation (min): 73 min  Total Timed Code Minutes- PT: 73 minute(s)   Therapy Charges for Today     Code Description Service Date Service Provider Modifiers Qty    74512860674 HC PT MANUAL THERAPY EA 15 MIN 4/15/2019 Elisabeth Moreau PTA, CLT-LANA GP 5                    Elisabeth Moreau PTA, CLT-LANA  4/15/2019

## 2019-04-17 ENCOUNTER — HOSPITAL ENCOUNTER (OUTPATIENT)
Dept: PHYSICAL THERAPY | Facility: HOSPITAL | Age: 59
Setting detail: THERAPIES SERIES
Discharge: HOME | End: 2019-04-17

## 2019-04-17 DIAGNOSIS — I89.0 LYMPHEDEMA OF BOTH LOWER EXTREMITIES: Primary | ICD-10-CM

## 2019-04-17 PROCEDURE — 97140 MANUAL THERAPY 1/> REGIONS: CPT

## 2019-04-17 NOTE — THERAPY TREATMENT NOTE
Outpatient Physical Therapy Lymphedema Treatment Note  [facility]     Patient Name: Dada Tucker  : 1960  MRN: [id number]  Today's Date: 2019        Visit Date: 2019    Visit Dx:    ICD-10-CM ICD-9-CM   1. Lymphedema of both lower extremities I89.0 457.1       Patient Active Problem List   Diagnosis   • KHALIF (obstructive sleep apnea)        Lymphedema     Row Name 19 1415             Subjective Pain    Able to rate subjective pain?  yes  -AL      Pre-Treatment Pain Level  5  -AL      Subjective Pain Comment  Neck and shoulders  -AL         Subjective Comments    Subjective Comments  He worked on the HEP and MLD, he feels like this helped the swelling in the legs. He remembers in  after he was septic and had pneumonia, he had an area in the left groin thre was a lymph node that was hard.  When he squeezed it, orange fluid came out, eventually a week later another one showed up and when he squeezed it blood came out.  Another came up in the groin with blood coming out when he squeezed.  He did mention this to the Dr.  He states he still has a hard node in the L groin that hurts when he pushes, no blood comes out now, the node is smaller.  After these areas appeared, he started having the swelling in the L leg.    -AL         Lymphedema Measurements    Measurement Type(s)  Circumferential  -AL      Circumferential Areas  Lower extremities  -AL         BLE Circumferential (cm)    Measurement Location 1  BOT  -AL      Left 1  25.4 cm  -AL      Right 1  25.6 cm  -AL      Measurement Location 2  +10  -AL      Left 2  33.5 cm  -AL      Right 2  31.6 cm  -AL      Measurement Location 3  +10  -AL      Left 3  29.9 cm  -AL      Right 3  28.7 cm  -AL      Measurement Location 4  +10  -AL      Left 4  39.6 cm  -AL      Right 4  38.2 cm  -AL      Measurement Location 5  +10  -AL      Left 5  47.9 cm  -AL      Right 5  45.4 cm  -AL      Measurement Location 6  +10  -AL      Left 6  40.5 cm  -AL       Right 6  39 cm  -AL      Measurement Location 7  +10  -AL      Left 7  46.3 cm  -AL      Right 7  45.4 cm  -AL      Measurement Location 8  +10  -AL      Left 8  58.6 cm  -AL      Right 8  55.7 cm  -AL      LLE Circumferential Total  321.7 cm  -AL      RLE Circumferential Total  309.6 cm  -AL         Manual Lymphatic Drainage    Manual Lymphatic Drainage  initial sequence;opened regional lymph nodes;opened anastamoses;extremity treatment  -AL      Initial Sequence  short neck;abdomen;diaphragmatic breathing  -AL      Abdomen  superficial  -AL      Diaphragmatic Breathing  x 9 with superficial abdominals  -AL      Opened Regional Lymph Nodes  axillary;inguinal  -AL      Axillary  right;left  -AL      Inguinal  left;right  -AL      Opened Anastamoses  inguino-axillary  -AL      Inguino-Axillary  right;left  -AL      Extremity Treatment  MLD to full limb  -AL      MLD to Full Limb  B LE's  -AL         Compression/Skin Care    Compression/Skin Care  skin care  -AL      Skin Care  moisturizing lotion applied  -AL      Compression/Skin Care Comments  His [employer] garments have been shipped to his house.  -AL        User Key  (r) = Recorded By, (t) = Taken By, (c) = Cosigned By    Initials Name Provider Type    Elisabeth Kyle PTA, CLT-HAYDEE Physical Therapy Assistant                        PT Assessment/Plan     Row Name 04/17/19 8255          PT Assessment    Assessment Comments  Patient is working on the HEP and MLD.  He is not able to reach his ankles and feet when doing the MLD.  He has had a reduction in both LE's.  He will have the pump demo this week.   -AL        PT Plan    PT Plan Comments  Will continue with POC  -AL       User Key  (r) = Recorded By, (t) = Taken By, (c) = Cosigned By    Initials Name Provider Type    Elisabeth Kyle PTA, CLT-HAYDEE Physical Therapy Assistant             Exercises     Row Name 04/17/19 7875             Subjective Comments    Subjective Comments  He worked on the HEP  and MLD, he feels like this helped the swelling in the legs. He remembers in 2014 after he was septic and had pneumonia, he had an area in the left groin thre was a lymph node that was hard.  When he squeezed it, orange fluid came out, eventually a week later another one showed up and when he squeezed it blood came out.  Another came up in the groin with blood coming out when he squeezed.  He did mention this to the Dr.  He states he still has a hard node in the L groin that hurts when he pushes, no blood comes out now, the node is smaller.  After these areas appeared, he started having the swelling in the L leg.    -AL         Subjective Pain    Able to rate subjective pain?  yes  -AL      Pre-Treatment Pain Level  5  -AL      Subjective Pain Comment  Neck and shoulders  -AL        User Key  (r) = Recorded By, (t) = Taken By, (c) = Cosigned By    Initials Name Provider Type    Elisabeth Kyle, PTA, CLT-HAYDEE Physical Therapy Assistant                      PT OP Goals     Row Name 04/17/19 1415          PT Short Term Goals    STG Date to Achieve  04/24/19  -AL     STG 1  Patient will understand the importance of skin care with lymphedema.  -AL     STG 1 Progress  Ongoing  -AL     STG 1 Progress Comments  Improving  -AL     STG 2  Patient will be able to perform self MLD or have assistance of wife at home.   -AL     STG 2 Progress  Ongoing  -AL     STG 2 Progress Comments  He is doing the MLD to areas he can reach  -AL     STG 3  Patient will have appropriate compression garments for lower legs.   -AL     STG 3 Progress  Ongoing  -AL     STG 3 Progress Comments  His compression garments have been shipped to his home  -AL        Long Term Goals    LTG 1  Patient will have no open areas to lower legs.  -AL     LTG 1 Progress  Ongoing  -AL     LTG 1 Progress Comments  Still has several open lonnie  -AL     LTG 2  Patient will be independent with a home lymphedema pump.  -AL     LTG 2 Progress  Ongoing  -AL     LTG 2  Progress Comments  He will have the pump demo this week  -AL     LTG 3  Patient will have appropriate compression for upper legs as indicated.   -AL     LTG 3 Progress  New  -AL     LTG 4  Patient will be independent with remedial HEP for lymphedema.  -AL     LTG 4 Progress  Ongoing  -AL     LTG 4 Progress Comments  He is doing what he can on the HEP  -AL        Time Calculation    PT Goal Re-Cert Due Date  05/10/19  -AL       User Key  (r) = Recorded By, (t) = Taken By, (c) = Cosigned By    Initials Name Provider Type    Elisabeth Kyle PTA, CLT-LANA Physical Therapy Assistant          Therapy Education  Education Details: Continue with HEP and MLD.  Try to wear the compression when the compression comes in.    Given: Edema management  Program: Reinforced, Progressed  Provided to: Patient  Level of Understanding: Verbalized              Time Calculation:   Start Time: 1415  Stop Time: 1535  Time Calculation (min): 80 min  Total Timed Code Minutes- PT: 80 minute(s)   Therapy Charges for Today     Code Description Service Date Service Provider Modifiers Qty    56970208443 HC PT MANUAL THERAPY EA 15 MIN 4/17/2019 Elisabeth Moreau PTA, CLT-LANA GP 5                    Elisabeth Moreau PTA, CLT-LANA  4/17/2019

## 2019-04-24 ENCOUNTER — HOSPITAL ENCOUNTER (OUTPATIENT)
Dept: PHYSICAL THERAPY | Facility: HOSPITAL | Age: 59
Setting detail: THERAPIES SERIES
Discharge: HOME | End: 2019-04-24

## 2019-04-24 ENCOUNTER — TELEPHONE (OUTPATIENT)
Dept: VASCULAR SURGERY | Facility: CLINIC | Age: 59
End: 2019-04-24

## 2019-04-24 DIAGNOSIS — I89.0 LYMPHEDEMA OF BOTH LOWER EXTREMITIES: Primary | ICD-10-CM

## 2019-04-24 PROCEDURE — 97140 MANUAL THERAPY 1/> REGIONS: CPT

## 2019-04-24 NOTE — TELEPHONE ENCOUNTER
Called to remind  Garrett of his appointment for Thursday, April 25th, 2019 at 1115 am with Dr Alvarado.     When calling it rang several times and states the voicemail box is not set up.

## 2019-04-24 NOTE — THERAPY TREATMENT NOTE
Outpatient Physical Therapy Lymphedema Treatment Note  [facility]     Patient Name: Dada Tucker  : 1960  MRN: [id number]  Today's Date: 2019        Visit Date: 2019    Visit Dx:    ICD-10-CM ICD-9-CM   1. Lymphedema of both lower extremities I89.0 457.1       Patient Active Problem List   Diagnosis   • KHALIF (obstructive sleep apnea)        Lymphedema     Row Name 19 1308             Subjective Pain    Able to rate subjective pain?  yes  -AL      Pre-Treatment Pain Level  6  -AL      Subjective Pain Comment  Neck and shoulders, no pain in legs.  -AL         Subjective Comments    Subjective Comments  He has the [employer] Basic garments with him today.  He tried to have his wife help him put them on, but it wasn't right.  He had the pump demo last week. He sees Dr. Alvarado and then goes to the [facility] after.  He is now taking the antibiotic Bactrin as the DrPiter felt like he needed a different antibiotic for his legs.  He states he does not want to wear the garments at night when sleeping.  -AL         Manual Lymphatic Drainage    Manual Lymphatic Drainage  initial sequence;opened regional lymph nodes;opened anastamoses;extremity treatment  -AL      Initial Sequence  short neck;abdomen;diaphragmatic breathing  -AL      Abdomen  superficial  -AL      Diaphragmatic Breathing  x 9 with superficial abdominals  -AL      Opened Regional Lymph Nodes  axillary;inguinal  -AL      Axillary  right;left  -AL      Inguinal  left;right  -AL      Opened Anastamoses  inguino-axillary  -AL      Inguino-Axillary  right;left  -AL      Extremity Treatment  MLD to full limb  -AL      MLD to Full Limb  B LE's  -AL         Compression/Skin Care    Compression/Skin Care  skin care;compression garment  -AL      Skin Care  moisturizing lotion applied  -AL      Compression Garment Comments  Wear the [employer] Basic garments with up.  Can wear them to bed if needed.  -AL      Compression/Skin Care Comments  Donned the  [employer] Basic garments with the small on the right and the medium on the left.  Explained how to don/doff garments.   -AL        User Key  (r) = Recorded By, (t) = Taken By, (c) = Cosigned By    Initials Name Provider Type    Elisabeth Kyle PTA, CLT-LANA Physical Therapy Assistant                        PT Assessment/Plan     Row Name 04/24/19 5818          PT Assessment    Assessment Comments  He has the F[employer] basic garments and the hybrid socks now.  They are a good fit.  He will don/doff them at home.  Will measure legs next treatment.    -AL        PT Plan    PT Plan Comments  Will continue with CDT.  -AL       User Key  (r) = Recorded By, (t) = Taken By, (c) = Cosigned By    Initials Name Provider Type    Elisabeth Kyle PTA, CLT-LANA Physical Therapy Assistant             Exercises     Row Name 04/24/19 1407             Subjective Comments    Subjective Comments  He has the [employer] Basic garments with him today.  He tried to have his wife help him put them on, but it wasn't right.  He had the pump demo last week. He sees Dr. Alvarado and then goes to the [facility] after.  He is now taking the antibiotic Bactrin as the Dr. felt like he needed a different antibiotic for his legs.  He states he does not want to wear the garments at night when sleeping.  -AL         Subjective Pain    Able to rate subjective pain?  yes  -AL      Pre-Treatment Pain Level  6  -AL      Subjective Pain Comment  Neck and shoulders, no pain in legs.  -AL        User Key  (r) = Recorded By, (t) = Taken By, (c) = Cosigned By    Initials Name Provider Type    Elisabeth Kyle PTA, CLT-LANA Physical Therapy Assistant                      PT OP Goals     Row Name 04/24/19 1307          PT Short Term Goals    STG Date to Achieve  04/24/19  -AL     STG 1  Patient will understand the importance of skin care with lymphedema.  -AL     STG 1 Progress  Ongoing  -AL     STG 1 Progress Comments  Improving  -AL     STG 2  Patient will be able  to perform self MLD or have assistance of wife at home.   -AL     STG 2 Progress  Ongoing  -AL     STG 2 Progress Comments  He is doing the self MLD to areas he can reach.  -AL     STG 3  Patient will have appropriate compression garments for lower legs.   -AL     STG 3 Progress  Ongoing  -AL     STG 3 Progress Comments  He now has the Farrow Basic garments.  -AL        Long Term Goals    LTG 1  Patient will have no open areas to lower legs.  -AL     LTG 1 Progress  Ongoing  -AL     LTG 2  Patient will be independent with a home lymphedema pump.  -AL     LTG 2 Progress  Ongoing  -AL     LTG 2 Progress Comments  He had a demo last week.  -AL     LTG 3  Patient will have appropriate compression for upper legs as indicated.   -AL     LTG 3 Progress  New  -AL     LTG 4  Patient will be independent with remedial HEP for lymphedema.  -AL     LTG 4 Progress  Ongoing  -AL        Time Calculation    PT Goal Re-Cert Due Date  05/10/19  -AL       User Key  (r) = Recorded By, (t) = Taken By, (c) = Cosigned By    Initials Name Provider Type    AL Elisabeth Moreau PTA, CLT-LANA Physical Therapy Assistant          Therapy Education  Education Details: Start wearing the Farrow Basic garments,  Wear the garments anytime up on feet.    Given: Edema management  Program: New  How Provided: Verbal, Demonstration  Provided to: Patient  Level of Understanding: Teach back education performed, Verbalized, Demonstrated              Time Calculation:   Start Time: 1308  Stop Time: 1425  Time Calculation (min): 77 min  Total Timed Code Minutes- PT: 77 minute(s)   Therapy Charges for Today     Code Description Service Date Service Provider Modifiers Qty    81990317775  PT MANUAL THERAPY EA 15 MIN 4/24/2019 Elisabeth Moreau PTA, CLT-LANA GP 5                    Elisabeth Moreau PTA, CLT-LANA  4/24/2019

## 2019-04-25 ENCOUNTER — TELEPHONE (OUTPATIENT)
Dept: PODIATRY | Facility: CLINIC | Age: 59
End: 2019-04-25

## 2019-04-25 ENCOUNTER — OFFICE VISIT (OUTPATIENT)
Dept: VASCULAR SURGERY | Facility: CLINIC | Age: 59
End: 2019-04-25

## 2019-04-25 VITALS
WEIGHT: 315 LBS | DIASTOLIC BLOOD PRESSURE: 72 MMHG | SYSTOLIC BLOOD PRESSURE: 142 MMHG | BODY MASS INDEX: 38.36 KG/M2 | HEIGHT: 76 IN | OXYGEN SATURATION: 98 % | HEART RATE: 70 BPM

## 2019-04-25 DIAGNOSIS — E78.2 MIXED HYPERLIPIDEMIA: ICD-10-CM

## 2019-04-25 DIAGNOSIS — I87.323 CHRONIC VENOUS HYPERTENSION WITH INFLAMMATION INVOLVING BOTH SIDES: Primary | ICD-10-CM

## 2019-04-25 DIAGNOSIS — I10 ESSENTIAL HYPERTENSION: ICD-10-CM

## 2019-04-25 PROCEDURE — 99204 OFFICE O/P NEW MOD 45 MIN: CPT | Performed by: SURGERY

## 2019-04-25 RX ORDER — CETIRIZINE HYDROCHLORIDE 5 MG/1
5 TABLET ORAL DAILY
COMMUNITY
End: 2021-08-27

## 2019-04-25 RX ORDER — BUMETANIDE 1 MG/1
1 TABLET ORAL DAILY
COMMUNITY

## 2019-04-25 RX ORDER — ALOGLIPTIN 25 MG/1
25 TABLET, FILM COATED ORAL DAILY
COMMUNITY
End: 2021-08-27

## 2019-04-25 RX ORDER — HYDRALAZINE HYDROCHLORIDE 25 MG/1
25 TABLET, FILM COATED ORAL 3 TIMES DAILY
COMMUNITY
Start: 2018-07-16

## 2019-04-25 NOTE — TELEPHONE ENCOUNTER
Called [facility] Medical records, requested Venous Doppler scan to be faxed.  I faxed medicar

## 2019-04-25 NOTE — PROGRESS NOTES
04/25/2019      Gigi Grullon MD  [address] [place] [address]    Dada Tucker  1960    Chief Complaint   Patient presents with   • Establish Care     Referred over by Dr Gigi Grullon for Lymphedema of Lower Extremity. Patient denies any stroke like symptoms.    • other     Patient states bilateral leg swelling, with redness and dryness. Patient states from mid calf down to the feet he cant feel anything. Patient states after wearing the compressions the upper part of his leg around the knee area was itching severely and itched all zurita. Patient does state while wearing them his bilateral legs was very hot, itching and solid red. Patient states he has had some drainage.        Dear Gigi Grullon MD:      HPI  I had the pleasure of seeing your patient Dada Tucker in the office today.  Thank you kindly for this consultation.  As you recall, Dada Tucker is a 58 y.o.  male who you are currently following for routine health maintenance.  He has had previous wounds to bilateral lower extremities.  He is following with lymphedema therapists and working to get lymphedema pumps.  He has erythema to bilateral lower extremities.  He is diabetic.  He did receive Farrow wraps for bilateral lower extremities, but reports itching since yesterday after wearing wraps.        Past Medical History:   Diagnosis Date   • Allergic rhinitis    • Allergic urticaria    • Arthritis    • Cervical stenosis of spine    • COPD (chronic obstructive pulmonary disease) (CMS/[facility])    • Dysthymia    • GERD (gastroesophageal reflux disease)    • Hyperlipidemia    • Hypertension    • Hypogonadism male    • Idiopathic peripheral neuropathy    • Narcolepsy    • KHALIF (obstructive sleep apnea)    • Psoriasis    • Psoriatic arthritis (CMS/[facility])    • PTSD (post-traumatic stress disorder)    • Recurrent major depressive disorder (CMS/[facility])    • Restless leg    • Rheumatoid arthritis (CMS/HCC)    • Type 2 diabetes mellitus (CMS/HCC)    •  "Vitamin B 12 deficiency    • Vitamin D deficiency        Past Surgical History:   Procedure Laterality Date   • CARPAL TUNNEL RELEASE  2005   • HEMORROIDECTOMY  2001   • RHINOPLASTY  2003   • SEPTOPLASTY, RESECTION INFERIOR TURBINATES N/A 10/13/2017    Procedure: SEPTOPLASTY, RESECTION INFERIOR TURBINATES viaa coblation;  Surgeon: Cedrick Carolina MD;  Location: [facility];  Service:    • UVULOPALATOPHARYNGOPLASTY  2003       Family History   Problem Relation Age of Onset   • Diabetes Mother    • Diabetes Father    • Hypertension Father    • Stroke Father    • Alcohol abuse Brother    • Drug abuse Brother        Social History     Socioeconomic History   • Marital status:      Spouse name: Not on file   • Number of children: Not on file   • Years of education: Not on file   • Highest education level: Not on file   Tobacco Use   • Smoking status: Never Smoker   • Smokeless tobacco: Never Used   Substance and Sexual Activity   • Alcohol use: No   • Drug use: No   • Sexual activity: Defer       Allergies   Allergen Reactions   • Ampicillin Anaphylaxis   • Ciprofloxacin Anaphylaxis   • Levaquin [Levofloxacin] Anaphylaxis   • Penicillins Anaphylaxis   • Atorvastatin Other (See Comments)     Chest pain, weakness   • Crestor [Rosuvastatin Calcium] Other (See Comments)     Chest pain , weakness   • Flonase [Fluticasone Propionate] Other (See Comments)     Depression    • Fluvastatin    • Gemfibrozil    • Niacin And Related Other (See Comments)     Facial flushing   • Pravastatin Other (See Comments)     Chest pain, weakness   • Simvastatin Other (See Comments)     \"chest pain\"   weakness   • Yellow Dyes (Non-Tartrazine) Other (See Comments)     \"disoriented\"       Prior to Admission medications    Medication Sig Start Date End Date Taking? Authorizing Provider   albuterol (PROVENTIL HFA;VENTOLIN HFA) 108 (90 Base) MCG/ACT inhaler Inhale 2 puffs Every 4 (Four) Hours As Needed for Wheezing.   Yes Provider, " MD Jeison   Alogliptin Benzoate 25 MG tablet Take 25 mg by mouth Daily.   Yes Jeison Lynch MD   aspirin 81 MG EC tablet Take 81 mg by mouth Daily.   Yes Jeison Lynch MD   baclofen (LIORESAL) 10 MG tablet Take 10 mg by mouth As Needed for Muscle Spasms.   Yes Jeison Lynch MD   bumetanide (BUMEX) 1 MG tablet Take 1 mg by mouth Daily.   Yes Jeison Lynch MD   cetirizine (zyrTEC) 5 MG tablet Take 5 mg by mouth Daily.   Yes Jeison Lynch MD   Cholecalciferol (VITAMIN D3) 3000 units tablet Take 2,000 mg by mouth Every Night.   Yes Jeison Lynch MD   Cholecalciferol 1000 units capsule Take 1 tablet by mouth.   Yes Jeison Lynch MD   FLUoxetine (PROzac) 20 MG capsule Take 40 mg by mouth Daily. 40 mg at lunch   Yes Jeison Lynch MD   gabapentin (NEURONTIN) 800 MG tablet Take 1,200 mg by mouth 3 (Three) Times a Day.   Yes Jeison Lynch MD   hydrALAZINE (APRESOLINE) 25 MG tablet Take 25 mg by mouth 2 (Two) Times a Day. 7/16/18  Yes Jeison Lynch MD   insulin glargine (LANTUS) 100 UNIT/ML injection Inject 20 Units under the skin Daily With Lunch.   Yes Jeison Lynch MD   lisinopril (PRINIVIL,ZESTRIL) 40 MG tablet Take 40 mg by mouth Daily. 20mg at lunch   Yes Jeison Lynch MD   NIFEdipine XL (PROCARDIA XL) 30 MG 24 hr tablet Take 30 mg by mouth Daily.   Yes Jeison Lynch MD   pantoprazole (PROTONIX) 40 MG EC tablet Take 40 mg by mouth 2 (Two) Times a Day.   Yes Jeison Lynch MD   sucralfate (CARAFATE) 1 G tablet Take 1 g by mouth 2 (Two) Times a Day.   Yes Jeison Lynch MD   vitamin B-12 (CYANOCOBALAMIN) 1000 MCG tablet Take 1,000 mcg by mouth Daily.   Yes Jeison Lynch MD   bisoprolol (ZEBeta) 5 MG tablet Take 5 mg by mouth Every Night.  4/25/19  Jeison Lynch MD   hydrochlorothiazide (HYDRODIURIL) 25 MG tablet Take 25 mg by mouth Daily.  4/25/19  Jeison Lynch MD   metFORMIN  "(GLUCOPHAGE) 1000 MG tablet Take 1,000 mg by mouth 2 (Two) Times a Day With Meals.  4/25/19  ProviderJeison MD   modafinil (PROVIGIL) 100 MG tablet Take 100 mg by mouth Daily.  4/25/19  Jeison Lynch MD   montelukast (SINGULAIR) 10 MG tablet Take 10 mg by mouth Every Night.  4/25/19  Jeison Lynch MD   SAXagliptin (ONGLYZA) 5 MG tablet Take 5 mg by mouth Daily.  4/25/19  Jeison Lynch MD       Review of Systems   Constitutional: Negative.    HENT: Negative.    Eyes: Negative.    Respiratory: Negative.    Cardiovascular: Positive for leg swelling.        Leg cramping   Gastrointestinal: Negative.    Endocrine: Negative.    Genitourinary: Negative.    Musculoskeletal: Negative.    Skin: Negative.    Allergic/Immunologic: Negative.    Neurological: Negative.    Hematological: Negative.    Psychiatric/Behavioral: Negative.        /72 (BP Location: Left arm, Patient Position: Sitting, Cuff Size: Adult)   Pulse 70   Ht 193 cm (76\")   Wt (!) 154 kg (340 lb)   SpO2 98%   BMI 41.39 kg/m²   Physical Exam   Constitutional: He is oriented to person, place, and time. He appears well-developed and well-nourished. No distress.   HENT:   Head: Normocephalic and atraumatic.   Mouth/Throat: Oropharynx is clear and moist and mucous membranes are normal.   Eyes: Pupils are equal, round, and reactive to light.   Neck: Normal range of motion. Neck supple. No JVD present. Carotid bruit is not present.   Cardiovascular: Normal rate, regular rhythm, S1 normal, S2 normal, normal heart sounds, intact distal pulses and normal pulses. Exam reveals no gallop and no friction rub.   No murmur heard.  Pulses:       Femoral pulses are 2+ on the right side, and 2+ on the left side.       Popliteal pulses are 2+ on the right side, and 2+ on the left side.        Dorsalis pedis pulses are 2+ on the right side, and 2+ on the left side.        Posterior tibial pulses are 2+ on the right side, and 2+ on the left " side.   Venous stasis bilateral lower extremities, swelling   Pulmonary/Chest: Effort normal and breath sounds normal.   Abdominal: Soft. Normal appearance, normal aorta and bowel sounds are normal. He exhibits no abdominal bruit. There is no hepatosplenomegaly. There is no tenderness.   Musculoskeletal: Normal range of motion. He exhibits edema (BLE).     Vascular Status -  His right foot exhibits no edema. His left foot exhibits no edema.  Neurological: He is alert and oriented to person, place, and time. He has normal strength. No cranial nerve deficit.   Skin: Skin is warm, dry and intact. He is not diaphoretic.   Psychiatric: He has a normal mood and affect. His behavior is normal. Judgment and thought content normal. Cognition and memory are normal.       No results found.    Patient Active Problem List   Diagnosis   • KHALIF (obstructive sleep apnea)   • Type 2 diabetes mellitus (CMS/HCC)   • Rheumatoid arthritis (CMS/HCC)   • Restless leg   • Psoriatic arthritis (CMS/HCC)   • Psoriasis   • Hypertension   • Hyperlipidemia         ICD-10-CM ICD-9-CM   1. Chronic venous hypertension with inflammation involving both sides I87.323 459.32   2. Essential hypertension I10 401.9   3. Mixed hyperlipidemia E78.2 272.2       Lab Frequency Next Occurrence   Follow Anesthesia Guidelines / Standing Orders Once 08/29/2017       Plan: After thoroughly evaluating Dada Tucker, I believe the best course of action is to proceed with some further testing.  I would like for him to undergo a two leg VVI and have him come back in the next 1 to 2 weeks at his convenience.  He was told down in [facility] that he had insufficiency and I am trying to get those results.  I told him to go home and take some Benadryl and try the compression stockings instead of the wraps going forward.  I asked him to keep his legs elevated as well.  He may be a great candidate for endovenous closure.  He does have a history of wounds with chronic  venous stasis. I did discuss vascular risk factors as they pertain to the progression of vascular disease including controlling hypertension and hyperlipidemia.  The patient can continue taking their current medication regimen as previously planned.  This was all discussed in full with complete understanding.    Thank you for allowing me to participate in the care of your patient.  Please do not hesitate with any questions or concerns.  I will keep you aware of any further encounters with Dada Tucker.        Sincerely yours,         Darrel Alvarado, DO         Scribed for Dr. Darrel Alvarado by Abena LIMA 4/25/2019 11:21 AM

## 2019-04-26 ENCOUNTER — HOSPITAL ENCOUNTER (OUTPATIENT)
Dept: PHYSICAL THERAPY | Facility: HOSPITAL | Age: 59
Setting detail: THERAPIES SERIES
Discharge: HOME | End: 2019-04-26

## 2019-04-26 ENCOUNTER — TELEPHONE (OUTPATIENT)
Dept: PODIATRY | Facility: CLINIC | Age: 59
End: 2019-04-26

## 2019-04-26 DIAGNOSIS — I89.0 LYMPHEDEMA OF BOTH LOWER EXTREMITIES: Primary | ICD-10-CM

## 2019-04-26 PROCEDURE — 97140 MANUAL THERAPY 1/> REGIONS: CPT

## 2019-04-26 NOTE — THERAPY TREATMENT NOTE
Outpatient Physical Therapy Lymphedema Treatment Note   [place]     Patient Name: Dada Tucker  : 1960  MRN: [id number]  Today's Date: 2019        Visit Date: 2019    Visit Dx:    ICD-10-CM ICD-9-CM   1. Lymphedema of both lower extremities I89.0 457.1       Patient Active Problem List   Diagnosis   • KHALIF (obstructive sleep apnea)   • Type 2 diabetes mellitus (CMS/[facility])   • Rheumatoid arthritis (CMS/[facility])   • Restless leg   • Psoriatic arthritis (CMS/[facility])   • Psoriasis   • Hypertension   • Hyperlipidemia        Lymphedema     Row Name 19 1250             Subjective Pain    Able to rate subjective pain?  yes  -AL      Pre-Treatment Pain Level  5  -AL      Subjective Pain Comment  Neck and shoulders  -AL         Subjective Comments    Subjective Comments  He had some itching when he wore the hybrid socks with the [employer] Basic wraps.  He saw Dr. Alvarado yesterday, and he wanted patient to stop wearing the Hybrid socks as he feels patient may be allergic to the Hybrid socks.  He also wants him to stop wearing the [employer] Basic Wraps.  Dr. Alvarado would like patient to wear light compression on the lower legs.    -AL         Lymphedema Measurements    Measurement Type(s)  Circumferential  -AL      Circumferential Areas  Lower extremities  -AL         BLE Circumferential (cm)    Measurement Location 1  BOT  -AL      Left 1  25.1 cm  -AL      Right 1  25.6 cm  -AL      Measurement Location 2  +10  -AL      Left 2  33.2 cm  -AL      Right 2  31.4 cm  -AL      Measurement Location 3  +10  -AL      Left 3  30.2 cm  -AL      Right 3  29.3 cm  -AL      Measurement Location 4  +10  -AL      Left 4  40.4 cm  -AL      Right 4  38.8 cm  -AL      Measurement Location 5  +10  -AL      Left 5  47.9 cm  -AL      Right 5  45 cm  -AL      Measurement Location 6  +10  -AL      Left 6  40.5 cm  -AL      Right 6  39.5 cm  -AL      Measurement Location 7  +10  -AL      Left 7  45 cm  -AL      Right 7  45.9 cm   -AL      Measurement Location 8  +10  -AL      Left 8  56.9 cm  -AL      Right 8  54.2 cm  -AL      LLE Circumferential Total  319.2 cm  -AL      RLE Circumferential Total  309.7 cm  -AL         Manual Lymphatic Drainage    Manual Lymphatic Drainage  initial sequence;opened regional lymph nodes;opened anastamoses;extremity treatment  -AL      Initial Sequence  short neck;abdomen;diaphragmatic breathing  -AL      Abdomen  superficial  -AL      Diaphragmatic Breathing  x 9 with superficial abdominals  -AL      Opened Regional Lymph Nodes  axillary;inguinal  -AL      Axillary  right;left  -AL      Inguinal  left;right  -AL      Opened Anastamoses  inguino-axillary  -AL      Inguino-Axillary  right;left  -AL      Extremity Treatment  MLD to full limb  -AL      MLD to Full Limb  B LE's  -AL         Compression/Skin Care    Compression/Skin Care  skin care;compression garment  -AL      Skin Care  moisturizing lotion applied  -AL      Compression/Skin Care Comments  Donned the Tubigrip to both lower legs  -AL        User Key  (r) = Recorded By, (t) = Taken By, (c) = Cosigned By    Initials Name Provider Type    Elisabeth Kyle PTA, CLT-LANA Physical Therapy Assistant                        PT Assessment/Plan     Row Name 04/26/19 1250          PT Assessment    Assessment Comments  He has had a reduction in size in the L LE.  No change in the R LE since last week.  He likes the Tubigrip on the lower legs.  He feels the legs are not as red today.    -AL        PT Plan    PT Plan Comments  Will continue with POC.   -AL       User Key  (r) = Recorded By, (t) = Taken By, (c) = Cosigned By    Initials Name Provider Type    Elisabeth Kyle PTA, CLT-HAYDEE Physical Therapy Assistant             Exercises     Row Name 04/26/19 1250             Subjective Comments    Subjective Comments  He had some itching when he wore the hybrid socks with the [employer] Basic wraps.  He saw Dr. Alvarado yesterday, and he wanted patient to stop  wearing the Hybrid socks as he feels patient may be allergic to the Hybrid socks.  He also wants him to stop wearing the [employer] Basic Wraps.  Dr. Alvarado would like patient to wear light compression on the lower legs.    -AL         Subjective Pain    Able to rate subjective pain?  yes  -AL      Pre-Treatment Pain Level  5  -AL      Subjective Pain Comment  Neck and shoulders  -AL        User Key  (r) = Recorded By, (t) = Taken By, (c) = Cosigned By    Initials Name Provider Type    Elisabeth Kyle PTA, JESSICA Physical Therapy Assistant                      PT OP Goals     Row Name 04/26/19 1250          PT Short Term Goals    STG Date to Achieve  04/24/19  -AL     STG 1  Patient will understand the importance of skin care with lymphedema.  -AL     STG 1 Progress  Ongoing  -AL     STG 2  Patient will be able to perform self MLD or have assistance of wife at home.   -AL     STG 2 Progress  Ongoing  -AL     STG 2 Progress Comments  Working on the MLD  -AL     STG 3  Patient will have appropriate compression garments for lower legs.   -AL     STG 3 Progress  Ongoing  -AL     STG 3 Progress Comments  He is wearing Tubigrip [facility] legs [facility]  -AL        Long Term Goals    LTG 1  Patient will have no open areas to lower legs.  -AL     LTG 1 Progress  Ongoing  -AL     LTG 2  Patient will be independent with a home lymphedema pump.  -AL     LTG 2 Progress  Ongoing  -AL     LTG 3  Patient will have appropriate compression for upper legs as indicated.   -AL     LTG 3 Progress  New  -AL     LTG 4  Patient will be independent with remedial HEP for lymphedema.  -AL     LTG 4 Progress  Ongoing  -AL        Time Calculation    PT Goal Re-Cert Due Date  05/10/19  -AL       User Key  (r) = Recorded By, (t) = Taken By, (c) = Cosigned By    Initials Name Provider Type    Elisabeth Kyle PTA, CLKAREEM-HAYDEE Physical Therapy Assistant          Therapy Education  Education Details: Continue to wear the tubigrip,  work on self MLD.  Given: Edema management  Program: Reinforced  How Provided: Verbal  Provided to: Patient  Level of Understanding: Verbalized              Time Calculation:   Start Time: 1250  Stop Time: 1418  Time Calculation (min): 88 min  Total Timed Code Minutes- PT: 88 minute(s)   Therapy Charges for Today     Code Description Service Date Service Provider Modifiers Qty    [id number] HC PT MANUAL THERAPY EA 15 MIN 4/26/2019 Elisabeth Moreau PTA, CLT-LANA GP 6                    Elisabeth Moreau PTA, CLT-LANA  4/26/2019

## 2019-05-01 ENCOUNTER — HOSPITAL ENCOUNTER (OUTPATIENT)
Dept: PHYSICAL THERAPY | Facility: HOSPITAL | Age: 59
Setting detail: THERAPIES SERIES
Discharge: HOME | End: 2019-05-01

## 2019-05-01 DIAGNOSIS — I89.0 LYMPHEDEMA OF BOTH LOWER EXTREMITIES: Primary | ICD-10-CM

## 2019-05-01 PROCEDURE — 97140 MANUAL THERAPY 1/> REGIONS: CPT

## 2019-05-01 NOTE — THERAPY TREATMENT NOTE
Outpatient Physical Therapy Lymphedema Treatment Note   [place]     Patient Name: Dada Tucker  : 1960  MRN: [id number]  Today's Date: 2019        Visit Date: 2019    Visit Dx:    ICD-10-CM ICD-9-CM   1. Lymphedema of both lower extremities I89.0 457.1       Patient Active Problem List   Diagnosis   • KHALIF (obstructive sleep apnea)   • Type 2 diabetes mellitus (CMS/[facility])   • Rheumatoid arthritis (CMS/[facility])   • Restless leg   • Psoriatic arthritis (CMS/[facility])   • Psoriasis   • Hypertension   • Hyperlipidemia        Lymphedema     Row Name 19 1300             Subjective Pain    Able to rate subjective pain?  yes  -AL      Pre-Treatment Pain Level  5  -AL      Subjective Pain Comment  Neck, shoulders, and hands  -AL         Subjective Comments    Subjective Comments  He is not as tired, he has a new Bipap machine.  His pumps are at his house, he has had a call from the  but he has not returned the call.  He may try to set it up himself.   -AL         Lymphedema Measurements    Measurement Type(s)  Circumferential  -AL      Circumferential Areas  Lower extremities  -AL         BLE Circumferential (cm)    Measurement Location 1  BOT  -AL      Left 1  25.1 cm  -AL      Right 1  26.4 cm  -AL      Measurement Location 2  +10  -AL      Left 2  32.9 cm  -AL      Right 2  29 cm  -AL      Measurement Location 3  +10  -AL      Left 3  29.8 cm  -AL      Right 3  29.7 cm  -AL      Measurement Location 4  +10  -AL      Left 4  39.9 cm  -AL      Right 4  37.5 cm  -AL      Measurement Location 5  +10  -AL      Left 5  46.5 cm  -AL      Right 5  44.5 cm  -AL      Measurement Location 6  +10  -AL      Left 6  40.3 cm  -AL      Right 6  39.2 cm  -AL      Measurement Location 7  +10  -AL      Left 7  46.6 cm  -AL      Right 7  44.7 cm  -AL      Measurement Location 8  +10  -AL      Left 8  55.5 cm  -AL      Right 8  53.5 cm  -AL      LLE Circumferential Total  316.6 cm  -AL      RLE Circumferential Total   304.5 cm  -AL         Manual Lymphatic Drainage    Manual Lymphatic Drainage  initial sequence;opened regional lymph nodes;opened anastamoses;extremity treatment  -AL      Initial Sequence  short neck;abdomen;diaphragmatic breathing  -AL      Abdomen  superficial  -AL      Diaphragmatic Breathing  x 9 with superficial abdominals  -AL      Opened Regional Lymph Nodes  axillary;inguinal  -AL      Axillary  right;left  -AL      Inguinal  left;right  -AL      Opened Anastamoses  inguino-axillary  -AL      Inguino-Axillary  right;left  -AL      Extremity Treatment  MLD to full limb  -AL      MLD to Full Limb  B LE's  -AL         Compression/Skin Care    Compression/Skin Care  skin care;compression garment  -AL      Skin Care  moisturizing lotion applied  -AL      Compression/Skin Care Comments  Donned the Tubigrip to both lower legs  -AL        User Key  (r) = Recorded By, (t) = Taken By, (c) = Cosigned By    Initials Name Provider Type    Elisabeth Kyle PTA, ALFONZO-HAYDEE Physical Therapy Assistant                        PT Assessment/Plan     Row Name 05/01/19 1300          PT Assessment    Assessment Comments  He has had a slight decrease in the size of both LE's.  He is working on the MLD and HEP.  He has been wearing the tubigrip on the lower legs.    -AL        PT Plan    PT Plan Comments  Will continue with CDT.  -AL       User Key  (r) = Recorded By, (t) = Taken By, (c) = Cosigned By    Initials Name Provider Type    Elisabeth Kyle PTA, CLT-HAYDEE Physical Therapy Assistant             Exercises     Row Name 05/01/19 1300             Subjective Comments    Subjective Comments  He is not as tired, he has a new Bipap machine.  His pumps are at his house, he has had a call from the  but he has not returned the call.  He may try to set it up himself.   -AL         Subjective Pain    Able to rate subjective pain?  yes  -AL      Pre-Treatment Pain Level  5  -AL      Subjective Pain Comment  Neck, shoulders, and  hands  -AL        User Key  (r) = Recorded By, (t) = Taken By, (c) = Cosigned By    Initials Name Provider Type    Elisabeth Kyle PTA, JESSICA Physical Therapy Assistant                      PT OP Goals     Row Name 05/01/19 1300          PT Short Term Goals    STG Date to Achieve  04/24/19  -AL     STG 1  Patient will understand the importance of skin care with lymphedema.  -AL     STG 1 Progress  Ongoing  -AL     STG 1 Progress Comments  He has two small open areas he thinks he may have scratched off when showering.  -AL     STG 2  Patient will be able to perform self MLD or have assistance of wife at home.   -AL     STG 2 Progress  Ongoing  -AL     STG 2 Progress Comments  He is working on the MLD  -AL     STG 3  Patient will have appropriate compression garments for lower legs.   -AL     STG 3 Progress  Ongoing  -AL        Long Term Goals    LTG 1  Patient will have no open areas to lower legs.  -AL     LTG 1 Progress  Ongoing  -AL     LTG 2  He has two small open areas he thinks he may have scratched off when showering.  -AL     LTG 2 Progress  Ongoing  -AL     LTG 3  Patient will have appropriate compression for upper legs as indicated.   -AL     LTG 3 Progress  New  -AL     LTG 4  Patient will be independent with remedial HEP for lymphedema.  -AL     LTG 4 Progress  Ongoing  -AL     LTG 4 Progress Comments  He is working on the HEP  -AL        Time Calculation    PT Goal Re-Cert Due Date  05/10/19  -AL       User Key  (r) = Recorded By, (t) = Taken By, (c) = Cosigned By    Initials Name Provider Type    Elisabeth Kyle PTA, CLT-LANA Physical Therapy Assistant          Therapy Education  Education Details: Continue with CDT  Given: Edema management  Program: Reinforced  How Provided: Verbal  Provided to: Patient  Level of Understanding: Verbalized              Time Calculation:   Start Time: 1300  Stop Time: 1425  Time Calculation (min): 85 min  Total Timed Code Minutes- PT: 85 minute(s)   Therapy  Charges for Today     Code Description Service Date Service Provider Modifiers Qty    78373974285 HC PT MANUAL THERAPY EA 15 MIN 5/1/2019 Elisabeth Moreau PTA, JESSICA GP 6                    Elisabeth Moreau PTA, CLT-LANA  5/1/2019

## 2019-05-08 ENCOUNTER — HOSPITAL ENCOUNTER (OUTPATIENT)
Dept: PHYSICAL THERAPY | Facility: HOSPITAL | Age: 59
Setting detail: THERAPIES SERIES
Discharge: HOME | End: 2019-05-08

## 2019-05-08 DIAGNOSIS — I89.0 LYMPHEDEMA OF BOTH LOWER EXTREMITIES: Primary | ICD-10-CM

## 2019-05-08 PROCEDURE — 97140 MANUAL THERAPY 1/> REGIONS: CPT | Performed by: PHYSICAL THERAPIST

## 2019-05-08 NOTE — THERAPY PROGRESS REPORT/RE-CERT
Outpatient Physical Therapy Lymphedema Progress Note  [facility]     Patient Name: Dada Tucker  : 1960  MRN: [id number]  Today's Date: 2019        Visit Date: 2019    Visit Dx:    ICD-10-CM ICD-9-CM   1. Lymphedema of both lower extremities I89.0 457.1       Patient Active Problem List   Diagnosis   • KHALIF (obstructive sleep apnea)   • Type 2 diabetes mellitus (CMS/HCC)   • Rheumatoid arthritis (CMS/HCC)   • Restless leg   • Psoriatic arthritis (CMS/[facility])   • Psoriasis   • Hypertension   • Hyperlipidemia        Lymphedema     Row Name 19 1300             Subjective Pain    Able to rate subjective pain?  yes  -HR      Pre-Treatment Pain Level  5  -HR      Subjective Pain Comment  Neck, shoulders, and hands  -HR         Subjective Comments    Subjective Comments  His right lower leg has some open skin on the back now.  -HR         Manual Lymphatic Drainage    Manual Lymphatic Drainage  initial sequence;opened regional lymph nodes;opened anastamoses;extremity treatment  -HR      Initial Sequence  short neck;abdomen;diaphragmatic breathing  -HR      Abdomen  superficial  -HR      Opened Regional Lymph Nodes  axillary;inguinal  -HR      Axillary  right;left  -HR      Inguinal  left;right  -HR      Opened Anastamoses  inguino-axillary  -HR      Inguino-Axillary  right;left  -HR      Extremity Treatment  MLD to full limb  -HR      MLD to Full Limb  B LE's  -HR         Compression/Skin Care    Compression/Skin Care  skin care;compression garment  -HR      Skin Care  moisturizing lotion applied  -HR      Compression/Skin Care Comments  Donned the Tubigrip to both lower legs  -HR        User Key  (r) = Recorded By, (t) = Taken By, (c) = Cosigned By    Initials Name Provider Type    HR Chelsey Souza, PT, DPT, CLT-HAYDEE Physical Therapist                        PT Assessment/Plan     Row Name 19 1300          PT Assessment    Assessment Comments  He has been trained on the use of the  pump and will continue daily treatments with it. He does have some open skin on the RLE today which he says just peeled off in the shower. He has is clean and covered. He has had a 12.8 cm decrease in the LLE since eval and a 10.4 cm decrease in the RLE. He is seeing the vascular surgeon next week.   -HR        PT Plan    PT Plan Comments  Cont POC  -HR       User Key  (r) = Recorded By, (t) = Taken By, (c) = Cosigned By    Initials Name Provider Type    HR Chelsey Souza, PT, DPT, CLT-HAYDEE Physical Therapist             Exercises     Row Name 05/08/19 1300             Subjective Comments    Subjective Comments  His right lower leg has some open skin on the back now.  -HR         Subjective Pain    Able to rate subjective pain?  yes  -HR      Pre-Treatment Pain Level  5  -HR      Subjective Pain Comment  Neck, shoulders, and hands  -HR        User Key  (r) = Recorded By, (t) = Taken By, (c) = Cosigned By    Initials Name Provider Type    HR Chelsey Souza, PT, DPT, CLT-HAYDEE Physical Therapist                      PT OP Goals     Row Name 05/08/19 1300          PT Short Term Goals    STG Date to Achieve  --  -HR     STG 1  Patient will understand the importance of skin care with lymphedema.  -HR     STG 1 Progress  Ongoing  -HR     STG 2  Patient will be able to perform self MLD or have assistance of wife at home.   -HR     STG 2 Progress  Ongoing  -HR     STG 3  Patient will have appropriate compression garments for lower legs.   -HR     STG 3 Progress  Ongoing  -HR     STG 3 Progress Comments  Currently only wearing tubigrip Size F for compression to lower legs.   -HR        Long Term Goals    LTG 1  Patient will have no open areas to lower legs.  -HR     LTG 1 Progress  Ongoing  -HR     LTG 1 Progress Comments  He has 2 small areas on the back of the R lower leg. Healthy tissue that he is keeping clean and covered.   -HR     LTG 2  Patient will be independent with a home lymphedema pump.  -HR     LTG  2 Progress  Ongoing  -HR     LTG 3  Patient will have appropriate compression for upper legs as indicated.   -HR     LTG 3 Progress  New  -HR     LTG 4  Patient will be independent with remedial HEP for lymphedema.  -HR     LTG 4 Progress  Ongoing  -HR        Time Calculation    PT Goal Re-Cert Due Date  06/07/19  -HR       User Key  (r) = Recorded By, (t) = Taken By, (c) = Cosigned By    Initials Name Provider Type    HR Chelsey Souza, PT, DPT, JESSICA Physical Therapist          Therapy Education  Given: Edema management  Program: Reinforced  How Provided: Verbal  Provided to: Patient  Level of Understanding: Verbalized              Time Calculation:       Therapy Charges for Today     Code Description Service Date Service Provider Modifiers Qty    58152000675 HC PT MANUAL THERAPY EA 15 MIN 5/8/2019 Chelsey Souza, PT, DPT, JESSICA GP 5                    Chelsey Souza PT, DPT, JESSICA  5/8/2019

## 2019-05-09 ENCOUNTER — HOSPITAL ENCOUNTER (OUTPATIENT)
Dept: ULTRASOUND IMAGING | Facility: HOSPITAL | Age: 59
Discharge: HOME | End: 2019-05-09
Admitting: SURGERY

## 2019-05-09 DIAGNOSIS — I87.323 CHRONIC VENOUS HYPERTENSION WITH INFLAMMATION INVOLVING BOTH SIDES: ICD-10-CM

## 2019-05-09 PROCEDURE — 93970 EXTREMITY STUDY: CPT

## 2019-05-09 PROCEDURE — 93970 EXTREMITY STUDY: CPT | Performed by: SURGERY

## 2019-05-15 ENCOUNTER — HOSPITAL ENCOUNTER (OUTPATIENT)
Dept: PHYSICAL THERAPY | Facility: HOSPITAL | Age: 59
Setting detail: THERAPIES SERIES
Discharge: HOME | End: 2019-05-15

## 2019-05-15 ENCOUNTER — TELEPHONE (OUTPATIENT)
Dept: VASCULAR SURGERY | Facility: CLINIC | Age: 59
End: 2019-05-15

## 2019-05-15 DIAGNOSIS — I89.0 LYMPHEDEMA OF BOTH LOWER EXTREMITIES: Primary | ICD-10-CM

## 2019-05-15 PROCEDURE — 97140 MANUAL THERAPY 1/> REGIONS: CPT

## 2019-05-15 NOTE — THERAPY TREATMENT NOTE
Outpatient Physical Therapy Lymphedema Treatment Note   [place]     Patient Name: Dada Tucker  : 1960  MRN: [id number]  Today's Date: 5/15/2019        Visit Date: 05/15/2019    Visit Dx:    ICD-10-CM ICD-9-CM   1. Lymphedema of both lower extremities I89.0 457.1       Patient Active Problem List   Diagnosis   • KHALIF (obstructive sleep apnea)   • Type 2 diabetes mellitus (CMS/HCC)   • Rheumatoid arthritis (CMS/HCC)   • Restless leg   • Psoriatic arthritis (CMS/HCC)   • Psoriasis   • Hypertension   • Hyperlipidemia        Lymphedema     Row Name 05/15/19 1300             Subjective Pain    Able to rate subjective pain?  yes  -AL      Pre-Treatment Pain Level  6  -AL      Subjective Pain Comment  lower back  -AL         Subjective Comments    Subjective Comments  He has the R lower leg covered with a dressing.  Dr. Grullon put patient back on an antibioitc because he was worried about patient's left 5th toe.   -AL         Lymphedema Measurements    Measurement Type(s)  Circumferential  -AL      Circumferential Areas  Lower extremities  -AL         BLE Circumferential (cm)    Measurement Location 1  BOT  -AL      Left 1  24.7 cm  -AL      Right 1  25.7 cm  -AL      Measurement Location 2  +10  -AL      Left 2  32 cm  -AL      Right 2  30.5 cm  -AL      Measurement Location 3  +10  -AL      Left 3  30 cm  -AL      Right 3  28 cm  -AL      Measurement Location 4  +10  -AL      Left 4  38.9 cm  -AL      Right 4  37 cm  -AL      Measurement Location 5  +10  -AL      Left 5  45.7 cm  -AL      Right 5  43.4 cm  -AL      Measurement Location 6  +10  -AL      Left 6  40.6 cm  -AL      Right 6  40.4 cm  -AL      Measurement Location 7  +10  -AL      Left 7  45.4 cm  -AL      Right 7  42.5 cm  -AL      Measurement Location 8  +10  -AL      Left 8  57.9 cm  -AL      Right 8  52.9 cm  -AL      LLE Circumferential Total  315.2 cm  -AL      RLE Circumferential Total  300.4 cm  -AL         RLE Circumferential (cm)     Measurement Location 1  BOT  -AL      Measurement Location 2  +10  -AL      Measurement Location 3  +10  -AL      Measurement Location 4  +10  -AL      Measurement Location 5  +10  -AL      Measurement Location 6  +10  -AL      Measurement Location 7  +10  -AL      Measurement Location 8  +10  -AL         Manual Lymphatic Drainage    Manual Lymphatic Drainage  initial sequence;opened regional lymph nodes;opened anastamoses;extremity treatment  -AL      Initial Sequence  short neck;abdomen;diaphragmatic breathing  -AL      Abdomen  superficial  -AL      Diaphragmatic Breathing  x 9 with superficial abdominals  -AL      Opened Regional Lymph Nodes  axillary;inguinal  -AL      Axillary  right;left  -AL      Inguinal  left;right  -AL      Opened Anastamoses  inguino-axillary  -AL      Inguino-Axillary  right;left  -AL      Extremity Treatment  MLD to full limb  -AL      MLD to Full Limb  B LE's  -AL         Compression/Skin Care    Compression/Skin Care  skin care;compression garment  -AL      Skin Care  moisturizing lotion applied  -AL      Compression/Skin Care Comments  Donned the Tubigrip to both lower legs  -AL        User Key  (r) = Recorded By, (t) = Taken By, (c) = Cosigned By    Initials Name Provider Type    Elisabeth Kyle PTA, CLKAREEM-HAYDEE Physical Therapy Assistant                        PT Assessment/Plan     Row Name 05/15/19 1300          PT Assessment    Assessment Comments  Patient has had a reduction both LE's.  He is using the pump.  He does have dense tissue in the L calf that does soften with the MLD.   -AL        PT Plan    PT Plan Comments  Continue with POC  -AL       User Key  (r) = Recorded By, (t) = Taken By, (c) = Cosigned By    Initials Name Provider Type    Elisabeth Kyle PTA, CLT-HAYDEE Physical Therapy Assistant             Exercises     Row Name 05/15/19 1300             Subjective Comments    Subjective Comments  He has the R lower leg covered with a dressing.  Dr. Grullon put patient  back on an antibioitc because he was worried about patient's left 5th toe.   -AL         Subjective Pain    Able to rate subjective pain?  yes  -AL      Pre-Treatment Pain Level  6  -AL      Subjective Pain Comment  lower back  -AL         Total Minutes    26439 - PT Manual Therapy Minutes  85  -AL        User Key  (r) = Recorded By, (t) = Taken By, (c) = Cosigned By    Initials Name Provider Type    Elisabeth Kyle, PTA, CLT-HAYDEE Physical Therapy Assistant                     Manual Rx (last 36 hours)      Manual Treatments     Row Name 05/15/19 1300             Total Minutes    60746 - PT Manual Therapy Minutes  85  -AL        User Key  (r) = Recorded By, (t) = Taken By, (c) = Cosigned By    Initials Name Provider Type    Elisabeth Kyle, PTA, CLT-HAYDEE Physical Therapy Assistant          PT OP Goals     Row Name 05/15/19 1300          PT Short Term Goals    STG 1  Patient will understand the importance of skin care with lymphedema.  -AL     STG 1 Progress  Ongoing  -AL     STG 2  Patient will be able to perform self MLD or have assistance of wife at home.   -AL     STG 2 Progress  Ongoing  -AL     STG 2 Progress Comments  He is compliant with self MLD  -AL     STG 3  Patient will have appropriate compression garments for lower legs.   -AL     STG 3 Progress  Ongoing  -AL     STG 3 Progress Comments  Currently only wearing tubigrip Size F for compression to lower legs.   -AL        Long Term Goals    LTG 1  Patient will have no open areas to lower legs.  -AL     LTG 1 Progress  Ongoing  -AL     LTG 1 Progress Comments  Still has open areas R lower leg  -AL     LTG 2  Patient will be independent with a home lymphedema pump.  -AL     LTG 2 Progress  Ongoing  -AL     LTG 2 Progress Comments  He is using the Flexitouch daily  -AL     LTG 3  Patient will have appropriate compression for upper legs as indicated.   -AL     LTG 3 Progress  New  -AL     LTG 4  Patient will be independent with remedial HEP for  lymphedema.  -AL     LTG 4 Progress  Ongoing  -AL     LTG 4 Progress Comments  He is working on the HEP  -AL        Time Calculation    PT Goal Re-Cert Due Date  06/07/19  -AL       User Key  (r) = Recorded By, (t) = Taken By, (c) = Cosigned By    Initials Name Provider Type    Elisabeth Kyle PTA, CLT-LANA Physical Therapy Assistant          Therapy Education  Education Details: Continue with CDT, use the Flexitouch pump.  Given: Edema management  Program: Reinforced  How Provided: Verbal  Provided to: Patient  Level of Understanding: Verbalized              Time Calculation:   Start Time: 1300  Stop Time: 1425  Time Calculation (min): 85 min   Therapy Charges for Today     Code Description Service Date Service Provider Modifiers Qty    76154936121 HC PT MANUAL THERAPY EA 15 MIN 5/15/2019 Elisabeth Moreau PTA, CLT-LANA GP 6                    Elisabeth Moreau PTA, CLT-LANA  5/15/2019

## 2019-05-15 NOTE — TELEPHONE ENCOUNTER
Left message reminding Mr Tucker of his appointment for Thursday, May 16th, 2019 at 130 pm with Dr Alvarado. Also advised if he had any questions or needed to reschedule to please call the office at [phone].

## 2019-05-16 ENCOUNTER — OFFICE VISIT (OUTPATIENT)
Dept: VASCULAR SURGERY | Facility: CLINIC | Age: 59
End: 2019-05-16

## 2019-05-16 VITALS
OXYGEN SATURATION: 96 % | HEART RATE: 73 BPM | SYSTOLIC BLOOD PRESSURE: 128 MMHG | BODY MASS INDEX: 38.36 KG/M2 | WEIGHT: 315 LBS | HEIGHT: 76 IN | DIASTOLIC BLOOD PRESSURE: 62 MMHG

## 2019-05-16 DIAGNOSIS — Q82.0 HEREDITARY EDEMA OF LEGS: Primary | ICD-10-CM

## 2019-05-16 DIAGNOSIS — I10 ESSENTIAL HYPERTENSION: ICD-10-CM

## 2019-05-16 PROCEDURE — 99214 OFFICE O/P EST MOD 30 MIN: CPT | Performed by: NURSE PRACTITIONER

## 2019-05-16 RX ORDER — SULFAMETHOXAZOLE AND TRIMETHOPRIM 800; 160 MG/1; MG/1
TABLET ORAL
Refills: 0 | COMMUNITY
Start: 2019-05-14

## 2019-05-16 NOTE — PROGRESS NOTES
"5/16/2019       Gigi Grullon MD  [address] [facility] [address]    Dada Tucker  1960    Chief Complaint   Patient presents with   • Follow-up     2 wk f/u with [facility]       Dear Gigi Grullon MD       HPI  I had the pleasure of seeing your patient Dada Tucker in the office today.    As you recall, Dada Tucker is a 58 y.o.  male who you are currently following for routine health maintenance.  He has had previous wounds to bilateral lower extremities.  He is following with lymphedema therapists and working to get lymphedema pumps.  He has erythema to bilateral lower extremities.  He is diabetic.  He was using Farrow wraps but developed erythema to this.  He is now wearing elastic sleeve provided by wound care.  He does report a significant decrease over the past week and a half since beginning use of lymphedema pumps.  He did brush against right calf and created a wound, however this does appear to be healing. He has been cleansing daily with soap and water and applying antibiotic ointment along with compression and lymphedema pumps.  He did have noninvasive testing performed which I did review in office.         Review of Systems   Constitutional: Negative.    HENT: Negative.    Eyes: Negative.    Respiratory: Negative.    Cardiovascular: Positive for leg swelling.        Leg cramping   Gastrointestinal: Negative.    Endocrine: Negative.    Genitourinary: Negative.    Musculoskeletal: Negative.    Skin: Positive for color change and wound.   Allergic/Immunologic: Negative.    Neurological: Negative.    Hematological: Negative.    Psychiatric/Behavioral: Negative.        /62   Pulse 73   Ht 193 cm (76\")   Wt (!) 154 kg (340 lb)   SpO2 96%   BMI 41.39 kg/m²   Physical Exam   Constitutional: He is oriented to person, place, and time. He appears well-developed and well-nourished. No distress.   HENT:   Head: Normocephalic and atraumatic.   Mouth/Throat: Oropharynx is clear and moist " and mucous membranes are normal.   Eyes: Pupils are equal, round, and reactive to light.   Neck: Normal range of motion. Neck supple. No JVD present. Carotid bruit is not present.   Cardiovascular: Normal rate, regular rhythm, S1 normal, S2 normal, normal heart sounds, intact distal pulses and normal pulses. Exam reveals no gallop and no friction rub.   No murmur heard.  Pulses:       Femoral pulses are 2+ on the right side, and 2+ on the left side.       Popliteal pulses are 2+ on the right side, and 2+ on the left side.        Dorsalis pedis pulses are 2+ on the right side, and 2+ on the left side.        Posterior tibial pulses are 2+ on the right side, and 2+ on the left side.   Venous stasis bilateral lower extremities, swelling  Small area very superficial that reports developed since he was here previously however appears to be healing overall to the right calf   Pulmonary/Chest: Effort normal and breath sounds normal.   Abdominal: Soft. Normal appearance, normal aorta and bowel sounds are normal. He exhibits no abdominal bruit. There is no hepatosplenomegaly. There is no tenderness.   Musculoskeletal: Normal range of motion. He exhibits edema (BLE).     Vascular Status -  His right foot exhibits no edema. His left foot exhibits no edema.  Neurological: He is alert and oriented to person, place, and time. He has normal strength. No cranial nerve deficit.   Skin: Skin is warm, dry and intact. He is not diaphoretic.   Psychiatric: He has a normal mood and affect. His behavior is normal. Judgment and thought content normal. Cognition and memory are normal.       Us Venous Doppler Lower Extremity Bilateral (duplex)    Result Date: 5/9/2019  Narrative: History: Bilateral lower extremity swelling  Comment: Venous valvular insufficiency testing was performed in both legs using duplex ultrasound with rapid cuff deflation technique.  The common femoral veins, popliteal veins, posterior tibial veins, greater saphenous  veins, and lesser saphenous veins were interrogated bilaterally.   On the right, the greater saphenous vein at the junction measures 7.5 mm. In the mid thigh measures 5.1 mm. Above the knee measures 4.3 mm. The mid calf measures 3.6mm. The ankle measures 3.4 mm. There is no evidence of significant reflux. Lesser saphenous vein is 4.4mm at the knee, 3.2 mm at the mid calf, and 2.4 mm at the ankle. There is no evidence of significant reflux. There is no evidence of DVT.  On the left, the greater saphenous vein at the junction measures 7.2 mm. In the mid thigh measures 5.2 mm. Above the knee measured 4.4 mm. The mid calf measures 3.6 mm. The ankle measured 3.3 mm. There is greater than 0.5 seconds of reflux at the midcalf level only. Lesser saphenous vein is 4.8mm at the knee, 3.6 mm at the mid calf, and 3.0 mm at the ankle. There is greater than 0.5 seconds of reflux at the knee. There is no evidence of DVT.      Impression: Mild reflux noted in the left greater saphenous vein. There is no evidence of reflux in the right greater saphenous vein. There is reflux in the left lesser saphenous vein at the knee level and no reflux in the right lesser saphenous vein. There is no evidence of bilateral lower extremity DVT.   This report was finalized on 05/09/2019 14:50 by Dr. Jeffery Norwood MD.      Patient Active Problem List   Diagnosis   • KHALIF (obstructive sleep apnea)   • Type 2 diabetes mellitus (CMS/[facility])   • Rheumatoid arthritis (CMS/[facility])   • Restless leg   • Psoriatic arthritis (CMS/[facility])   • Psoriasis   • Hypertension   • Hyperlipidemia         ICD-10-CM ICD-9-CM   1. 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 Essential hypertension I10 401.9           Plan: After thoroughly evaluating Dada Tucker, I believe the best course of action is to remain conservative from vascular standpoint.  I did review his testing and really shows no significant venous insufficiency.  He should continue with compression as I did  recommend he get compression stockings.  He is currently wearing a elastic bandage from wound care.  We will continue to follow up in 6 months.  He is currently on antibiotic for an infection to his left fifth digit.  This does not look significantly changed however [facility]  I did recommend if this does not resolve after course of antibiotics that he should probably follow up with him.  His blood pressure is stable on his current medication regimen.  I did discuss vascular risk factors as they pertain to the progression of vascular disease including controlling hypertension and hyperlipidemia.  The patient can continue taking their current medication regimen as previously planned.  This was all discussed in full with complete understanding.    Thank you for allowing me to participate in the care of your patient.  Please do not hesitate with any questions or concerns.  I will keep you aware of any further encounters with Dada Tucker.        Sincerely yours,         JANIE Carlson

## 2019-05-22 ENCOUNTER — HOSPITAL ENCOUNTER (OUTPATIENT)
Dept: PHYSICAL THERAPY | Facility: HOSPITAL | Age: 59
Setting detail: THERAPIES SERIES
End: 2019-05-22

## 2019-08-05 ENCOUNTER — HOSPITAL ENCOUNTER (OUTPATIENT)
Dept: GENERAL RADIOLOGY | Age: 59
Discharge: HOME | End: 2019-08-05
Payer: MEDICARE

## 2019-08-05 ENCOUNTER — HOSPITAL ENCOUNTER (OUTPATIENT)
Dept: CT IMAGING | Age: 59
Discharge: HOME | End: 2019-08-05
Payer: MEDICARE

## 2019-08-05 DIAGNOSIS — M54.5 LOW BACK PAIN, UNSPECIFIED BACK PAIN LATERALITY, UNSPECIFIED CHRONICITY, WITH SCIATICA PRESENCE UNSPECIFIED: ICD-10-CM

## 2019-08-05 PROCEDURE — 72131 CT LUMBAR SPINE W/O DYE: CPT

## 2019-08-05 NOTE — PROGRESS NOTES
Patient came today for a myelogram.  Due to his history of CKD stage 4, the patient was concerned to receive the contrast.  I expressed his concern to Dr. Jaime Bliss, radiologist, and he ordered a lumbar CT (no myelogram), to see if CT would reveal enough to not need the myelogram.  He felt a MRI would be a better option for the patient, however, the patient cannot have one due to metal in his front tooth. He stated the last time a MRI was attempted, it felt as if his tooth was going to be \"pulled out\" by the magnet, so he was taken out immediately. After CT, I spoke with Dr. Jaime Bliss again, and he felt the CT imaging was thorough and he didn't need the myelogram.  If Dr. Violet Ma decides he still want's the myelogram, Dr. Jaime Bliss felt the patient would need treatment to protect his kidneys pre and post myelogram.  I called Dr. Polina Franklin office and was transferred to [facility]. I left a detailed message on the nurse line.

## 2019-11-18 ENCOUNTER — TELEPHONE (OUTPATIENT)
Dept: VASCULAR SURGERY | Facility: CLINIC | Age: 59
End: 2019-11-18

## 2019-11-18 NOTE — TELEPHONE ENCOUNTER
Left message reminding Mr Tucker of his appointment for Tuesday, November 19th, 2019 at 2 pm with Dr Alvarado. Also advised if he had any questions or needed to reschedule to please call the office at [phone].

## 2020-02-25 ENCOUNTER — TELEPHONE (OUTPATIENT)
Dept: VASCULAR SURGERY | Facility: CLINIC | Age: 60
End: 2020-02-25

## 2020-02-25 NOTE — TELEPHONE ENCOUNTER
Left a message for a return call to discuss appointment time change. Dr. Alvarado will be out of the office , appointment has been changed to 8;30 on the same day , 03/03/2020.

## 2021-06-25 ENCOUNTER — TRANSCRIBE ORDERS (OUTPATIENT)
Dept: ADMINISTRATIVE | Facility: HOSPITAL | Age: 61
End: 2021-06-25

## 2021-06-25 ENCOUNTER — LAB (OUTPATIENT)
Dept: LAB | Facility: HOSPITAL | Age: 61
End: 2021-06-25

## 2021-06-25 DIAGNOSIS — R53.83 TIREDNESS: ICD-10-CM

## 2021-06-25 DIAGNOSIS — M25.50 PAIN IN JOINT, MULTIPLE SITES: ICD-10-CM

## 2021-06-25 DIAGNOSIS — H02.849 EDEMA OF EYELID, UNSPECIFIED LATERALITY: ICD-10-CM

## 2021-06-25 DIAGNOSIS — M25.50 PAIN IN JOINT, MULTIPLE SITES: Primary | ICD-10-CM

## 2021-06-25 LAB
ALBUMIN SERPL-MCNC: 4 G/DL (ref 3.5–5.2)
ALBUMIN/GLOB SERPL: 1.4 G/DL
ALP SERPL-CCNC: 51 U/L (ref 39–117)
ALT SERPL W P-5'-P-CCNC: 15 U/L (ref 1–41)
ANION GAP SERPL CALCULATED.3IONS-SCNC: 6.5 MMOL/L (ref 5–15)
AST SERPL-CCNC: 12 U/L (ref 1–40)
BASOPHILS # BLD AUTO: 0.05 10*3/MM3 (ref 0–0.2)
BASOPHILS NFR BLD AUTO: 0.6 % (ref 0–1.5)
BILIRUB SERPL-MCNC: 0.3 MG/DL (ref 0–1.2)
BUN SERPL-MCNC: 17 MG/DL (ref 8–23)
BUN/CREAT SERPL: 13.4 (ref 7–25)
CALCIUM SPEC-SCNC: 9 MG/DL (ref 8.6–10.5)
CHLORIDE SERPL-SCNC: 105 MMOL/L (ref 98–107)
CO2 SERPL-SCNC: 26.5 MMOL/L (ref 22–29)
CREAT SERPL-MCNC: 1.27 MG/DL (ref 0.76–1.27)
CRP SERPL-MCNC: 0.52 MG/DL (ref 0–0.5)
DEPRECATED RDW RBC AUTO: 41.5 FL (ref 37–54)
EOSINOPHIL # BLD AUTO: 0.07 10*3/MM3 (ref 0–0.4)
EOSINOPHIL NFR BLD AUTO: 0.8 % (ref 0.3–6.2)
ERYTHROCYTE [DISTWIDTH] IN BLOOD BY AUTOMATED COUNT: 12.6 % (ref 12.3–15.4)
FERRITIN SERPL-MCNC: 83.1 NG/ML (ref 30–400)
GFR SERPL CREATININE-BSD FRML MDRD: 57 ML/MIN/1.73
GLOBULIN UR ELPH-MCNC: 2.8 GM/DL
GLUCOSE SERPL-MCNC: 67 MG/DL (ref 65–99)
HCT VFR BLD AUTO: 39.3 % (ref 37.5–51)
HGB BLD-MCNC: 13.1 G/DL (ref 13–17.7)
IMM GRANULOCYTES # BLD AUTO: 0.02 10*3/MM3 (ref 0–0.05)
IMM GRANULOCYTES NFR BLD AUTO: 0.2 % (ref 0–0.5)
LYMPHOCYTES # BLD AUTO: 2.1 10*3/MM3 (ref 0.7–3.1)
LYMPHOCYTES NFR BLD AUTO: 25.1 % (ref 19.6–45.3)
MCH RBC QN AUTO: 30 PG (ref 26.6–33)
MCHC RBC AUTO-ENTMCNC: 33.3 G/DL (ref 31.5–35.7)
MCV RBC AUTO: 90.1 FL (ref 79–97)
MONOCYTES # BLD AUTO: 0.67 10*3/MM3 (ref 0.1–0.9)
MONOCYTES NFR BLD AUTO: 8 % (ref 5–12)
NEUTROPHILS NFR BLD AUTO: 5.47 10*3/MM3 (ref 1.7–7)
NEUTROPHILS NFR BLD AUTO: 65.3 % (ref 42.7–76)
NRBC BLD AUTO-RTO: 0 /100 WBC (ref 0–0.2)
PLATELET # BLD AUTO: 243 10*3/MM3 (ref 140–450)
PMV BLD AUTO: 10.8 FL (ref 6–12)
POTASSIUM SERPL-SCNC: 4.4 MMOL/L (ref 3.5–5.2)
PROT SERPL-MCNC: 6.8 G/DL (ref 6–8.5)
RBC # BLD AUTO: 4.36 10*6/MM3 (ref 4.14–5.8)
SODIUM SERPL-SCNC: 138 MMOL/L (ref 136–145)
WBC # BLD AUTO: 8.38 10*3/MM3 (ref 3.4–10.8)

## 2021-06-25 PROCEDURE — 85025 COMPLETE CBC W/AUTO DIFF WBC: CPT

## 2021-06-25 PROCEDURE — 80053 COMPREHEN METABOLIC PANEL: CPT

## 2021-06-25 PROCEDURE — 82728 ASSAY OF FERRITIN: CPT

## 2021-06-25 PROCEDURE — 86753 PROTOZOA ANTIBODY NOS: CPT

## 2021-06-25 PROCEDURE — 86140 C-REACTIVE PROTEIN: CPT

## 2021-06-25 PROCEDURE — 36415 COLL VENOUS BLD VENIPUNCTURE: CPT

## 2021-07-01 LAB
T CRUZI IGG SER IA-ACNC: 0.3 IV
T CRUZI IGM SER-ACNC: NORMAL

## 2021-08-19 ENCOUNTER — TRANSCRIBE ORDERS (OUTPATIENT)
Dept: ADMINISTRATIVE | Facility: HOSPITAL | Age: 61
End: 2021-08-19

## 2021-08-19 ENCOUNTER — LAB (OUTPATIENT)
Dept: LAB | Facility: HOSPITAL | Age: 61
End: 2021-08-19

## 2021-08-19 DIAGNOSIS — M25.50 PAIN IN JOINT, MULTIPLE SITES: Primary | ICD-10-CM

## 2021-08-19 DIAGNOSIS — R53.83 TIREDNESS: ICD-10-CM

## 2021-08-19 DIAGNOSIS — H02.846 EDEMA OF LEFT EYELID: ICD-10-CM

## 2021-08-19 LAB
ALBUMIN SERPL-MCNC: 4.1 G/DL (ref 3.5–5.2)
ALBUMIN/GLOB SERPL: 1.5 G/DL
ALP SERPL-CCNC: 63 U/L (ref 39–117)
ALT SERPL W P-5'-P-CCNC: 16 U/L (ref 1–41)
ANION GAP SERPL CALCULATED.3IONS-SCNC: 7 MMOL/L (ref 5–15)
AST SERPL-CCNC: 13 U/L (ref 1–40)
BASOPHILS # BLD AUTO: 0.03 10*3/MM3 (ref 0–0.2)
BASOPHILS NFR BLD AUTO: 0.3 % (ref 0–1.5)
BILIRUB SERPL-MCNC: 0.3 MG/DL (ref 0–1.2)
BUN SERPL-MCNC: 21 MG/DL (ref 8–23)
BUN/CREAT SERPL: 17.6 (ref 7–25)
CALCIUM SPEC-SCNC: 9.1 MG/DL (ref 8.6–10.5)
CHLORIDE SERPL-SCNC: 104 MMOL/L (ref 98–107)
CO2 SERPL-SCNC: 29 MMOL/L (ref 22–29)
CREAT SERPL-MCNC: 1.19 MG/DL (ref 0.76–1.27)
DEPRECATED RDW RBC AUTO: 41.1 FL (ref 37–54)
EOSINOPHIL # BLD AUTO: 0.09 10*3/MM3 (ref 0–0.4)
EOSINOPHIL NFR BLD AUTO: 1 % (ref 0.3–6.2)
ERYTHROCYTE [DISTWIDTH] IN BLOOD BY AUTOMATED COUNT: 12.8 % (ref 12.3–15.4)
GFR SERPL CREATININE-BSD FRML MDRD: 62 ML/MIN/1.73
GLOBULIN UR ELPH-MCNC: 2.8 GM/DL
GLUCOSE SERPL-MCNC: 88 MG/DL (ref 65–99)
HCT VFR BLD AUTO: 36.5 % (ref 37.5–51)
HGB BLD-MCNC: 12.4 G/DL (ref 13–17.7)
IMM GRANULOCYTES # BLD AUTO: 0.04 10*3/MM3 (ref 0–0.05)
IMM GRANULOCYTES NFR BLD AUTO: 0.4 % (ref 0–0.5)
LYMPHOCYTES # BLD AUTO: 2.19 10*3/MM3 (ref 0.7–3.1)
LYMPHOCYTES NFR BLD AUTO: 24.6 % (ref 19.6–45.3)
MCH RBC QN AUTO: 29.9 PG (ref 26.6–33)
MCHC RBC AUTO-ENTMCNC: 34 G/DL (ref 31.5–35.7)
MCV RBC AUTO: 88 FL (ref 79–97)
MONOCYTES # BLD AUTO: 0.8 10*3/MM3 (ref 0.1–0.9)
MONOCYTES NFR BLD AUTO: 9 % (ref 5–12)
NEUTROPHILS NFR BLD AUTO: 5.75 10*3/MM3 (ref 1.7–7)
NEUTROPHILS NFR BLD AUTO: 64.7 % (ref 42.7–76)
NRBC BLD AUTO-RTO: 0 /100 WBC (ref 0–0.2)
PLATELET # BLD AUTO: 210 10*3/MM3 (ref 140–450)
PMV BLD AUTO: 10.1 FL (ref 6–12)
POTASSIUM SERPL-SCNC: 4.4 MMOL/L (ref 3.5–5.2)
PROT SERPL-MCNC: 6.9 G/DL (ref 6–8.5)
RBC # BLD AUTO: 4.15 10*6/MM3 (ref 4.14–5.8)
SODIUM SERPL-SCNC: 140 MMOL/L (ref 136–145)
WBC # BLD AUTO: 8.9 10*3/MM3 (ref 3.4–10.8)

## 2021-08-19 PROCEDURE — 86753 PROTOZOA ANTIBODY NOS: CPT | Performed by: INTERNAL MEDICINE

## 2021-08-19 PROCEDURE — 85025 COMPLETE CBC W/AUTO DIFF WBC: CPT | Performed by: INTERNAL MEDICINE

## 2021-08-19 PROCEDURE — 36415 COLL VENOUS BLD VENIPUNCTURE: CPT | Performed by: INTERNAL MEDICINE

## 2021-08-19 PROCEDURE — 80053 COMPREHEN METABOLIC PANEL: CPT | Performed by: INTERNAL MEDICINE

## 2021-08-22 LAB — T CRUZI IGG SER IA-ACNC: 0.1 IV

## 2021-08-25 LAB — T CRUZI IGM SER-ACNC: NORMAL

## 2021-08-26 ENCOUNTER — TELEPHONE (OUTPATIENT)
Dept: VASCULAR SURGERY | Facility: CLINIC | Age: 61
End: 2021-08-26

## 2021-08-26 NOTE — TELEPHONE ENCOUNTER
Spoke with  Garrett reminding him of his appointment for Friday, August 27th, 2021 at 115 pm with Abena LIMA. Mr Tucker confirmed he would be here.

## 2021-08-27 ENCOUNTER — OFFICE VISIT (OUTPATIENT)
Dept: VASCULAR SURGERY | Facility: CLINIC | Age: 61
End: 2021-08-27

## 2021-08-27 VITALS
SYSTOLIC BLOOD PRESSURE: 140 MMHG | BODY MASS INDEX: 39.17 KG/M2 | OXYGEN SATURATION: 97 % | HEIGHT: 75 IN | HEART RATE: 62 BPM | WEIGHT: 315 LBS | DIASTOLIC BLOOD PRESSURE: 82 MMHG

## 2021-08-27 DIAGNOSIS — E78.2 MIXED HYPERLIPIDEMIA: ICD-10-CM

## 2021-08-27 DIAGNOSIS — I10 ESSENTIAL HYPERTENSION: ICD-10-CM

## 2021-08-27 DIAGNOSIS — Q82.0 LYMPHEDEMA, HEREDITARY: Primary | ICD-10-CM

## 2021-08-27 PROCEDURE — 99213 OFFICE O/P EST LOW 20 MIN: CPT | Performed by: NURSE PRACTITIONER

## 2021-08-27 RX ORDER — ALLOPURINOL 100 MG/1
100 TABLET ORAL EVERY 12 HOURS SCHEDULED
COMMUNITY
Start: 2020-01-15

## 2021-08-27 RX ORDER — EZETIMIBE 10 MG/1
10 TABLET ORAL NIGHTLY
COMMUNITY
Start: 2018-02-01

## 2021-08-27 RX ORDER — TRAMADOL HYDROCHLORIDE 50 MG/1
50 TABLET ORAL NIGHTLY
COMMUNITY

## 2023-01-23 ENCOUNTER — TELEPHONE (OUTPATIENT)
Dept: NEUROLOGY | Age: 63
End: 2023-01-23

## 2023-01-23 NOTE — TELEPHONE ENCOUNTER
Mychal returned missed call  to schedule a  new pt appt. Tried to schedule 1/31 @ 11 . Could not schedule due to block in template, no override access. Please be advised that the best time to call him to accommodate their needs is Anytime. Thank you.

## 2023-02-14 ENCOUNTER — OFFICE VISIT (OUTPATIENT)
Dept: NEUROLOGY | Age: 63
End: 2023-02-14

## 2023-02-14 VITALS — OXYGEN SATURATION: 97 % | SYSTOLIC BLOOD PRESSURE: 123 MMHG | DIASTOLIC BLOOD PRESSURE: 59 MMHG | HEART RATE: 67 BPM

## 2023-02-14 DIAGNOSIS — G31.84 MCI (MILD COGNITIVE IMPAIRMENT): ICD-10-CM

## 2023-02-14 DIAGNOSIS — R41.3 MEMORY LOSS: Primary | ICD-10-CM

## 2023-02-14 DIAGNOSIS — G47.33 OSA (OBSTRUCTIVE SLEEP APNEA): ICD-10-CM

## 2023-02-14 DIAGNOSIS — R40.0 SOMNOLENCE, DAYTIME: ICD-10-CM

## 2023-02-14 RX ORDER — EZETIMIBE 10 MG/1
10 TABLET ORAL DAILY
COMMUNITY

## 2023-02-14 RX ORDER — MEMANTINE HYDROCHLORIDE 10 MG/1
10 TABLET ORAL DAILY
Qty: 60 TABLET | Refills: 3 | Status: SHIPPED | OUTPATIENT
Start: 2023-02-14

## 2023-02-14 RX ORDER — BUMETANIDE 1 MG/1
1 TABLET ORAL
COMMUNITY

## 2023-02-14 RX ORDER — INSULIN GLARGINE 100 [IU]/ML
50 INJECTION, SOLUTION SUBCUTANEOUS 2 TIMES DAILY
COMMUNITY

## 2023-02-14 RX ORDER — ALLOPURINOL 100 MG/1
TABLET ORAL
COMMUNITY
Start: 2020-01-15

## 2023-02-14 RX ORDER — INSULIN ASPART 100 [IU]/ML
18 INJECTION, SOLUTION INTRAVENOUS; SUBCUTANEOUS 2 TIMES DAILY
COMMUNITY

## 2023-02-14 RX ORDER — FERROUS SULFATE 325(65) MG
325 TABLET ORAL
COMMUNITY

## 2023-02-14 RX ORDER — VILAZODONE HYDROCHLORIDE 10 MG/1
10 TABLET ORAL
COMMUNITY

## 2023-02-14 RX ORDER — PEN NEEDLE, DIABETIC 31 GX5/16"
NEEDLE, DISPOSABLE MISCELLANEOUS SEE ADMIN INSTRUCTIONS
COMMUNITY
Start: 2021-10-31

## 2023-02-14 RX ORDER — INSULIN ASPART 100 [IU]/ML
INJECTION, SOLUTION INTRAVENOUS; SUBCUTANEOUS 3 TIMES DAILY
COMMUNITY

## 2023-02-14 RX ORDER — GABAPENTIN 400 MG/1
CAPSULE ORAL
COMMUNITY

## 2023-02-14 RX ORDER — HYDRALAZINE HYDROCHLORIDE 25 MG/1
25 TABLET, FILM COATED ORAL 3 TIMES DAILY
COMMUNITY

## 2023-02-14 RX ORDER — CHLORAL HYDRATE 500 MG
1000 CAPSULE ORAL 2 TIMES DAILY
COMMUNITY
Start: 2023-02-07

## 2023-02-14 RX ORDER — COLCHICINE 0.6 MG/1
TABLET ORAL
COMMUNITY
Start: 2023-01-16

## 2023-02-14 RX ORDER — BLOOD SUGAR DIAGNOSTIC
STRIP MISCELLANEOUS
COMMUNITY
Start: 2022-06-20

## 2023-02-14 NOTE — PROGRESS NOTES
[facility] Neurology Office Note      Patient:   Mellisa Bullard  MR#:    [id number]  Account Number:                         YOB: 1960  Date of Evaluation:  2/14/2023  Time of Note:                          3:15 PM  Primary/Referring Physician:  Arti Ellis MD   Consulting Physician:  LEONCIO Jose    NEW PATIENT CONSULTATION      Chief Complaint   Patient presents with    New Patient     Other amnesia Hx TIA       HISTORY OF PRESENT ILLNESS    Mellisa Bullard is a 57y.o. year old male here for evaluation and treatment of cognitive problems that began since last February. He is alone today. Lives at home with his wife. The family and the patient identify problems with changes in short term memory, perhaps some long term memory issues. He was being thoroughly evaluated for this in [place] with work up in process. APRN he was seeing at this time is no longer there. They do note word recall difficulty. There is repetition of questions. There is a degree of mood change with increased irritation but overall mood is well-controlled. Possibly had a visual hallucination about 1 week ago, states he saw a black object moving though yard. There is poor sleep. He has known ARTEMIO; he does use CPAP machine. He states that he has a very high flow rate of 24.5 and mask is leaking. He states humidifier is running out about 5 hours after he goes to sleep. He states is a light sleeper and is not feeling well-rested. There is daytime somnolence. Last sleep study was 14 years ago. Possibly has RLS component as well that has not been diagnosed. He requires no assistance with  ADL's, he is cooking. He is having more issues with handling finances as of late. Appetite is decreased. Weight has been stable overall. He is driving. He has gotten lost twice in the past year. No tickets, accidents. There is bilateral hand tremor, left greater than right. There is possibly newer gait abnormality, is using cane.  He is wearing BLE braces for bilateral foot drop, he has had this since 1993. There are no new falls. He has history of TIA last year. He does not have family history of memory loss. He reports that he has had neurocognitive testing completed in September of last year at the [place] outpatient clinic in [facility]. He reports that showed that he had MCI. Has had PET metabolic brain scan at [facility] that did not reveal any clear Alzheimer's or dementia process. He states he has stage IV liver disease, and known carotid artery stenosis of 57%. He is being closely monitored by vascular surgery in [place] for this. He is also being followed by hematology in [place] as well for protein abnormality. He also had recent stress test completed. It appears he has had thorough testing completed, I will need all these records for review. He reports that he has not yet had MRI completed.         Past Medical History:   Diagnosis Date    Allergic rhinitis     Anemia     Arthritis     Cancer (HCC)     Chronic kidney disease     COPD (chronic obstructive pulmonary disease) ([address]     Depression     Diabetes mellitus, type 2 (HCC)     GERD (gastroesophageal reflux disease)     Hearing loss     Hyperlipidemia     Hypertension     Neuromuscular disorder ([address]     Obesity     Obstructive sleep apnea     2000 in [place]    Peripheral neuropathy     Pneumonia     PTSD (post-traumatic stress disorder)     Rheumatoid arthritis ([address]     Tuberculosis     Varicella     Visual impairment        Past Surgical History:   Procedure Laterality Date    ABSCESS DRAINAGE Left     thigh    ABSCESS DRAINAGE Right     thigh    BASAL CELL CARCINOMA EXCISION      nose    CARPAL TUNNEL RELEASE Bilateral     COLON SURGERY      HEMORRHOID SURGERY      RHINOPLASTY      THROAT SURGERY  2005    UPPP    VASECTOMY         Family History   Problem Relation Age of Onset    Pancreatic Cancer Mother     Arthritis Mother     Cancer Mother     Diabetes Mother     Heart Disease Mother Elevated Lipids Mother     Hypertension Mother     Mental Illness Mother     Vision Loss Mother     Diabetes type 2  Father     Hypertension Father     Heart Disease Father     Arthritis Father     Stroke Father     Asthma Sister     Drug Abuse Brother     Arthritis Maternal Grandmother     Stroke Maternal Grandmother     Asthma Maternal Grandfather     Hearing Loss Maternal Grandfather     Arthritis Paternal Grandmother        Social History     Socioeconomic History    Marital status:      Spouse name: Not on file    Number of children: Not on file    Years of education: Not on file    Highest education level: Not on file   Occupational History    Not on file   Tobacco Use    Smoking status: Never    Smokeless tobacco: Never   Substance and Sexual Activity    Alcohol use: No    Drug use: No    Sexual activity: Not on file   Other Topics Concern    Not on file   Social History Narrative    Not on file     Social Determinants of Health     Financial Resource Strain: Not on file   Food Insecurity: Not on file   Transportation Needs: Not on file   Physical Activity: Not on file   Stress: Not on file   Social Connections: Not on file   Intimate Partner Violence: Not on file   Housing Stability: Not on file       Current Outpatient Medications   Medication Sig Dispense Refill    bumetanide (BUMEX) 1 MG tablet Take 1 mg by mouth      Cetirizine HCl 10 MG CAPS Zyrtec 10 mg capsule   Take 1 capsule every day by oral route at bedtime.       empagliflozin (JARDIANCE) 25 MG tablet Take 25 mg by mouth daily      ezetimibe (ZETIA) 10 MG tablet Take 10 mg by mouth daily      ferrous sulfate (IRON 325) 325 (65 Fe) MG tablet Take 325 mg by mouth daily (with breakfast)      hydrALAZINE (APRESOLINE) 25 MG tablet Take 25 mg by mouth 3 times daily      insulin aspart (NOVOLOG) 100 UNIT/ML injection vial Inject into the skin 3 times daily 4 units in the morning, 18 units at lunch, 18 units at bedtime      insulin glargine (LANTUS) 100 UNIT/ML injection vial Inject 50 Units into the skin 2 times daily      Insulin Pen Needle (PEN NEEDLES 31GX5/16\") 31G X 8 MM MISC See Admin Instructions      allopurinol (ZYLOPRIM) 100 MG tablet allopurinol 100 mg tablet   Take 1 tablet twice a day by oral route. colchicine (COLCRYS) 0.6 MG tablet colchicine 0.6 mg tablet   Take 1 tablet every day by oral route as needed.       gabapentin (NEURONTIN) 400 MG capsule gabapentin 400 mg capsule   3 caps tid      blood glucose test strips (ACCU-CHEK GUIDE) strip Use 4 times daily  E11.69 Exempt      metroNIDAZOLE (METROCREAM) 0.75 % cream Apply topically 2 times daily      Omega-3 Fatty Acids (FISH OIL) 1000 MG capsule Take 1,000 mg by mouth 2 times daily      vilazodone HCl (VIIBRYD) 10 MG TABS Take 10 mg by mouth      memantine (NAMENDA) 10 MG tablet Take 1 tablet by mouth daily 60 tablet 3    Cholecalciferol 75 MCG (3000 UT) TABS Take 1 tablet by mouth daily      Cyanocobalamin (VITAMIN B-12 CR PO) Take 1,200 mcg by mouth daily       traZODone (DESYREL) 100 MG tablet Take 100 mg by mouth nightly       lisinopril (PRINIVIL;ZESTRIL) 40 MG tablet Take 40 mg by mouth daily 1 po every AM and 1/2 tab at noon      aspirin 81 MG tablet Take 81 mg by mouth daily      baclofen (LIORESAL) 10 MG tablet Take 10 mg by mouth as needed      traMADol (ULTRAM) 50 MG tablet Take 50 mg by mouth every 6 hours as needed for Pain      NIFEdipine (ADALAT CC) 60 MG extended release tablet Take 30 mg by mouth daily      albuterol sulfate  (90 BASE) MCG/ACT inhaler Inhale 2 puffs into the lungs every 6 hours as needed for Wheezing      insulin aspart (NOVOLOG FLEXPEN) 100 UNIT/ML injection pen Inject 18 Units into the skin 2 times daily at 0800 and 1400 (Patient not taking: Reported on 2/14/2023)      FLUoxetine (PROZAC) 20 MG tablet Take by mouth daily (Patient not taking: Reported on 2/14/2023)      metFORMIN (GLUCOPHAGE) 1000 MG tablet Take 1,000 mg by mouth 2 times daily (with meals)  (Patient not taking: Reported on 2/14/2023)      montelukast (SINGULAIR) 10 MG tablet Take 10 mg by mouth nightly  (Patient not taking: Reported on 2/14/2023)      pantoprazole (PROTONIX) 40 MG tablet Take 40 mg by mouth 2 times daily  (Patient not taking: Reported on 2/14/2023)      modafinil (PROVIGIL) 200 MG tablet Take 200 mg by mouth daily 1-2 po daily (Patient not taking: Reported on 2/14/2023)      saxagliptin (ONGLYZA) 5 MG TABS tablet Take 5 mg by mouth daily (Patient not taking: Reported on 2/14/2023)      Insulin Detemir (LEVEMIR SC) Inject 20 Units into the skin daily (Patient not taking: Reported on 2/14/2023)      clonazePAM (KLONOPIN) 0.5 MG tablet Take 0.5 mg by mouth 3 times daily as needed (Patient not taking: Reported on 2/14/2023)      bisoprolol (ZEBETA) 10 MG tablet Take 10 mg by mouth daily (Patient not taking: Reported on 2/14/2023)       No current facility-administered medications for this visit. Allergies   Allergen Reactions    Latex Hives and Rash    Ampicillin Anaphylaxis    Ciprofloxacin Anaphylaxis    Levofloxacin Hives, Anaphylaxis and Swelling    Lexapro [Escitalopram] Anaphylaxis    Penicillins Anaphylaxis, Dizziness or Vertigo, Other (See Comments) and Shortness Of Breath    Atorvastatin Other (See Comments)     Chest pain, weakness  Chest pain, weakness  Chest pain, weakness    Cerivastatin Other (See Comments)     Other reaction(s):  Other (See Comments)  \"chest pain\"   weakness  Chest pain, weakness  Chest pain, weakness  Chest pain , weakness  Chest pain, weakness  Chest pain, weakness  Chest pain , weakness  \"chest pain\"   weakness    Fluticasone Propionate Other (See Comments)     Depression   Depression   Depression       Fluvastatin     Gemfibrozil Other (See Comments)    Niacin And Related Other (See Comments)     Facial flushing  Facial flushing  Facial flushing  Facial flushing  Facial flushing  Facial flushing  Facial flushing      Pravastatin Other (See Comments)     Chest pain, weakness  Chest pain, weakness  Chest pain, weakness    Rosuvastatin      Chest pain , weakness    Rosuvastatin Calcium Other (See Comments)     Chest pain , weakness    Simvastatin Other (See Comments)     \"chest pain\"   weakness  \"chest pain\"   weakness  \"chest pain\"   weakness    Statins Other (See Comments)     Other reaction(s): Other (See Comments)  \"chest pain\"   weakness  Chest pain, weakness  Chest pain, weakness  Chest pain , weakness  Chest pain, weakness  Chest pain, weakness  Chest pain , weakness  \"chest pain\"   weakness  Chest pain , weakness    Yellow Dyes (Non-Tartrazine) Other (See Comments)     \"disoriented\"  Other reaction(s): Altered Mental Status  Other reaction(s): Other (See Comments)  \"disoriented\"  \"disoriented\"  \"disoriented\"  \"disoriented\"  Other reaction(s):  Other (See Comments)  \"disoriented\"  \"disoriented\"           REVIEW OF SYSTEMS  Constitutional: []Fever []Sweat []Chills [] Recent Injury [x] Denies all unless marked  HEENT:[x]Headache  [] Head Injury/Hearing Loss  [] Sore Throat  [] Ear Ache/Dizziness  [x] Denies all unless marked  Spine:  [x] Neck pain  [x] Back pain  [] Sciaticia  [x] Denies all unless marked  Cardiovascular:[]Heart Disease []Chest Pain [] Palpitations  [x] Denies all unless marked  Pulmonary: []Shortness of Breath []Cough   [x] Denies all unless marke  Gastrointestinal: []Nausea  []Vomiting  []Abdominal Pain  []Constipation  []Diarrhea  []Dark Bloody Stools  [x] Denies all unless marked  Psychiatric/Behavioral:[] Depression [] Anxiety [x] Denies all unless marked  Genitourinary:   [] Frequency  [] Urgency  [] Incontinence [] Pain with Urination  [x] Denies all unless marked  Extremities: [x]Pain  [x]Swelling  [x] Denies all unless marked  Musculoskeletal: [x] Muscle Pain  [x] Joint Pain  [x] Arthritis [] Muscle Cramps [] Muscle Twitches  [x] Denies all unless marked  Sleep: [] Insomnia [] Snoring [] Restless Legs [x] Sleep Apnea  [] Daytime Sleepiness  [x] Denies all unless marked  Skin:[] Rash [] Skin Discoloration [x] Denies all unless marked   Neurological: [x]Visual Disturbance/Memory Loss [x] Loss of Balance [] Slurred Speech/Weakness [] Seizures  [x] Vertigo/Dizziness [x] Denies all unless marked    The MA has completed the ROS with the patient. I have reviewed it in its' entirety with the patient and agree with the documentation. PHYSICAL EXAM  BP (!) 123/59   Pulse 67   SpO2 97%       Constitutional - No acute distress    HEENT- Conjunctiva normal.  No scars, masses, or lesions over external nose or ears, no neck masses noted, no jugular vein distension, no bruit  Cardiac- Regular rate and rhythm  Pulmonary- Good expansion, normal effort without use of accessory muscles  Musculoskeletal - No significant wasting of muscles noted, no bony deformities  Extremities - No clubbing, cyanosis or edema  Skin - Warm, dry, and intact. No rash, erythema, or pallor  Psychiatric - Mood, affect, and behavior appear normal      NEUROLOGICAL EXAM     Mental status   [x] Awake, alert, oriented   [x]Affect attention and concentration appear appropriate  []Recent and remote memory appears unremarkable  [x]Speech normal without dysarthria or aphasia, comprehension and repetition intact.    COMMENTS:    Cranial Nerves [x]No VF deficit to confrontation,  [x]PERRLA, EOMI, no nystagmus, conjugate eye movements, no ptosis  [x]Face symmetric  [x]Facial sensation intact  [x]Tongue midline no atrophy or fasciculations present  [x]Palate midline, hearing to finger rub normal bilaterally  [x]Shoulder shrug and SCM testing normal bilaterally  COMMENTS:   Motor   [x]5/5 strength x 4 extremities  [x]Normal bulk and tone  []No tremor present  [x]No rigidity or bradykinesia noted  COMMENTS:fine intention tremor bilateral hands   Sensory  [x]Sensation intact to light touch, vibration, and proprioception BLE  [x]Sensation intact to light touch, vibration, and proprioception BUE  COMMENTS:   Coordination [x]FTN normal bilaterally   []HTS normal bilaterally  [x]ANTON normal bilaterally. COMMENTS:   Reflexes  []Symmetric and non-pathological  []Toes down going bilaterally  [x]No clonus present  COMMENTS: Known bilateral foot drop, chronic. Gait                  [x]Normal steady gait    []Ataxic    []Spastic     []Magnetic     []Shuffling  COMMENTS:limping gait, chronic antalgic, decreased R arm swing        LABS RECORD AND IMAGING REVIEW (As below and per HPI)      CT LUMBAR SPINE WO CONTRAST    Result Date: 8/5/2019  EXAMINATION: CT LUMBAR SPINE WO CONTRAST 8/5/2019 12:54 PM HISTORY: CT lumbar spine without contrast 8/5/2019 11:29 AM History: M54.5 Comparison: None DLP: 1822 mGy cm Technique: Serial helical tomographic images of the lumbar spine were obtained without the use of intravenous contrast. Additionally, sagittal and coronal reformatted images were provided for review. Findings: There is no evidence of fracture or subluxation. Normal lumbar lordosis is maintained. Vertebral body heights are well maintained. The disc spaces are preserved. Facet joint hypertrophy is present. There is encroachment on the left neural foramen at the L5-S1 level. . The posterior elements are intact. The visualized soft tissues are unremarkable. Impression: 1. Facet joint hypertrophy is present with  encroachment on the left intervertebral neural foramen at the L5-S1 level. Signed by Dr Gris Milan on 8/5/2019 1:14 PM        Impression    The pattern of brain uptake of FDG is not suggestive of Alzheimer, body, or frontotemporal dementia. Mild supratentorial volume loss. The estimated dose to any other individual from exposure to the released individual meets the requirements of [facility] Regulation 1200-02-07-.35(2) and [facility] 10 CFR 35.75; the patient was released without written instructions.      Electronically signed by  Romario Saez on 7/20/2022 6:58 AM      Reviewed referral records     ASSESSMENT:    Evangelista Rodriguez is a 57y.o. year old male here for evaluation of cognitive impairment. He was previously being seen by [facility] neurology for this with work-up yet to be completed. He is here to establish care now because his previous neurologist is no longer in practice. He reports he has had extensive blood work completed, neurocognitive testing completed, nerve conduction study, and PET metabolic brain scan at [facility]. Unfortunately I do not have any of these records today. We will put records request in so that I can further review test that have been completed and the results. MoCA test is unreliable as patient is taking this test multiple times. It is of note that he has known obstructive sleep apnea. He does wear CPAP at night but is still reporting poor sleep and daytime somnolence. He has not had a new sleep study in around 14 years. He also feels like he is having restless legs as well. This will need to be evaluated further. He also reports he has not had MRI of his brain. He does have history of TIA last year feels this is when memory issues really started. He is known diabetic and has some carotid artery stenosis as well. He is at high risk for having moderate to extensive small vessel disease that could be affecting memory from a vascular component. Will assess this with MRI brain. He is being monitored by vascular surgery in [facility] for carotid artery stenosis of 57% as reported by patient. Has had metabolic brain scan at [facility] that noted no clear diagnosis of Alzheimer's disease or frontotemporal dementia. He was on Namenda for short time and felt that he did have a large benefit from this medicine. It was stopped after normal scan at [facility]. On exam there is a bilateral intention tremor. There is a chronic antalgic gait, mildly decreased right arm swing. No clear parkinsonian symptoms.   We will further evaluate today with MRI brain, sleep referral, and sleep study scheduled. Will need time dedicated to records review as well once these are received. ICD-10-CM    1. Memory loss  R41.3 MRI BRAIN W WO CONTRAST     memantine (NAMENDA) 10 MG tablet      2. ARTEMIO (obstructive sleep apnea)  G47.33       3. MCI (mild cognitive impairment)  G31.84       4. Somnolence, daytime  R40.0             PLAN:  MRI brain W WO. Will restart Namenda 10 mg daily as patient had clear benefit from this and he has prior neurocognitive testing indicating MCI. Will not obtain any blood work today as I need to know what he has already had completed previously. Sleep study 14 years ago-referral placed to [place] with sleep study ordered. [place] [facility] records needed. Dr. Lisy Finch at [facility] outpatient clinic has dome neurocognitive testing already, noted MCI. Records of this needed. Follow-up with hematology as scheduled. Follow-up with vascular surgery in [place] as scheduled for carotid stenosis. Maximize stroke risk factors including blood pressure, glucose, and cholesterol control. Continue daily ASA. 10.  Return in about 3 months (around 5/14/2023) for Follow up, sooner with worsening symptoms. This dictation was generated by voice recognition computer software. Although all attempts were made to edit the dictation for accuracy, there may be errors in the transcription that are not intended.     LEONCIO Peralta

## 2023-02-14 NOTE — PROGRESS NOTES
REVIEW OF SYSTEMS    Constitutional: []Fever []Sweat []Chills [] Recent Injury [x] Denies all unless marked  HEENT:[x]Headache  [] Head Injury/Hearing Loss  [] Sore Throat  [] Ear Ache/Dizziness  [x] Denies all unless marked  Spine:  [x] Neck pain  [x] Back pain  [] Sciaticia  [x] Denies all unless marked  Cardiovascular:[]Heart Disease []Chest Pain [] Palpitations  [x] Denies all unless marked  Pulmonary: []Shortness of Breath []Cough   [x] Denies all unless marke  Gastrointestinal: []Nausea  []Vomiting  []Abdominal Pain  []Constipation  []Diarrhea  []Dark Bloody Stools  [x] Denies all unless marked  Psychiatric/Behavioral:[] Depression [] Anxiety [x] Denies all unless marked  Genitourinary:   [] Frequency  [] Urgency  [] Incontinence [] Pain with Urination  [x] Denies all unless marked  Extremities: [x]Pain  [x]Swelling  [x] Denies all unless marked  Musculoskeletal: [x] Muscle Pain  [x] Joint Pain  [x] Arthritis [] Muscle Cramps [] Muscle Twitches  [x] Denies all unless marked  Sleep: [] Insomnia [] Snoring [] Restless Legs [x] Sleep Apnea  [] Daytime Sleepiness  [x] Denies all unless marked  Skin:[] Rash [] Skin Discoloration [x] Denies all unless marked   Neurological: [x]Visual Disturbance/Memory Loss [x] Loss of Balance [] Slurred Speech/Weakness [] Seizures  [x] Vertigo/Dizziness [x] Denies all unless marked

## 2023-03-08 ENCOUNTER — HOSPITAL ENCOUNTER (OUTPATIENT)
Dept: MRI IMAGING | Age: 63
Discharge: HOME | End: 2023-03-08
Payer: MEDICARE

## 2023-03-08 ENCOUNTER — HOSPITAL ENCOUNTER (OUTPATIENT)
Dept: SLEEP CENTER | Age: 63
Discharge: HOME | End: 2023-03-10
Payer: MEDICARE

## 2023-03-08 DIAGNOSIS — R41.3 MEMORY LOSS: ICD-10-CM

## 2023-03-08 LAB
GFR SERPL CREATININE-BSD FRML MDRD: 51 ML/MIN/{1.73_M2}
PERFORMED ON: ABNORMAL
POC CREATININE: 1.5 MG/DL (ref 0.3–1.3)
POC SAMPLE TYPE: ABNORMAL

## 2023-03-08 PROCEDURE — 82565 ASSAY OF CREATININE: CPT

## 2023-03-08 PROCEDURE — A9577 INJ MULTIHANCE: HCPCS | Performed by: NURSE PRACTITIONER

## 2023-03-08 PROCEDURE — 70553 MRI BRAIN STEM W/O & W/DYE: CPT

## 2023-03-08 PROCEDURE — 6360000004 HC RX CONTRAST MEDICATION: Performed by: NURSE PRACTITIONER

## 2023-03-08 PROCEDURE — 95811 POLYSOM 6/>YRS CPAP 4/> PARM: CPT

## 2023-03-08 RX ADMIN — GADOBENATE DIMEGLUMINE 20 ML: 529 INJECTION, SOLUTION INTRAVENOUS at 13:30

## 2023-03-09 DIAGNOSIS — G31.84 MCI (MILD COGNITIVE IMPAIRMENT): ICD-10-CM

## 2023-03-09 DIAGNOSIS — R40.0 SOMNOLENCE, DAYTIME: ICD-10-CM

## 2023-03-09 DIAGNOSIS — G47.33 OSA (OBSTRUCTIVE SLEEP APNEA): Primary | ICD-10-CM

## 2023-03-09 DIAGNOSIS — R41.3 MEMORY LOSS: ICD-10-CM

## 2023-03-09 NOTE — PROGRESS NOTES
[place] [address]  [place], [address]  Phone [phone] Fax [phone]     Sleep Study Technician Review    Patient Name:  Evi Erazo  :   1960  Referring Provider: LEONCIO Lopez *    Brief History:  Evi Erazo is a 57 y.o. Male with a history of COPD, depression, diabetes, GERDS, hypertension, ARTEMIO, and obesity. He has poor sleep. He has known ARTEMIO; he does use CPAP machine. He states that he has a very high flow rate of 24.5 and mask is leaking. He states humidifier is running out about 5 hours after he goes to sleep. He states is a light sleeper and is not feeling well-rested. There is daytime somnolence. Last sleep study was 14 years ago. Possibly has RLS component as well that has not been diagnosed. Height:  76 inches  Weight: 326 lbs  BMI: 39.68  Neck Circ: 18\"  Mallampati 4  ESS: 20    Type of Study: PSG with C PAP titration  Time Stage Position Snore Hypopnea Obs Apnea Anita Apnea PAP O2   2200 Awake Left No No No No CPAP 17 RA   2300 2 Right No No No No CPAP 17 RA   2400 Awake Supine No Yes No No BIPAP 18/14 RA   0100 2 Supine No No No No BIPAP 18/14 RA   0200 2 Supine No No No No BIPAP 18/14 RA   0300 2 Supine No No No No BIPAP 18/14 RA   0400 2 Supine No No No No BIPAP 18/14 RA     Summary: Patient was started on a CPAP pressure of 8 for patient comfort due to patient feeling smothered. Patient called out and stated he felt smothered and requested we place him on a pressure of 17 because that was his starting pressure at home. Patients pressure was increased to 15 at this time and patient still felt he did not have enough pressure. Per patient request patient was placed on CPAP of 17 starting off and patient became less restless. Patient was unable to tolerate CPAP and was placed on BIPAP due to arousals.        [facility]   Final PAP settings: BIPAP 18/14   Mask Type: Nasal   Mask: P-10   Mask Size: medium    The study was reviewed briefly with Mychal Paniagua. He will be notified of the formal results and recommendations after the study is scored and interpreted. The report will be sent to his referring provider.     Technician: Caitlyn ANGLIE [facility] [place] RRT, RPSGT

## 2023-03-22 ENCOUNTER — TELEMEDICINE (OUTPATIENT)
Dept: NEUROLOGY | Age: 63
End: 2023-03-22

## 2023-03-22 ENCOUNTER — TELEPHONE (OUTPATIENT)
Dept: NEUROLOGY | Age: 63
End: 2023-03-22

## 2023-03-22 DIAGNOSIS — R41.3 AMNESIA: Primary | ICD-10-CM

## 2023-03-22 NOTE — TELEPHONE ENCOUNTER
Vasyl with [employer] called to let me know that Winstonfroilan's visit from 2/14/2023 was approved and new updated authorization with correct address would be send to our office. Barbara Del Cid stated the authorization number is  [id number]  Valid 11/29/22-9/11/23    Also the referral for Jud Fisher was approved and will be sent once finalized. Also his sleep study is approved as well that he completed on 3/8/2023.

## 2023-04-07 ENCOUNTER — TELEPHONE (OUTPATIENT)
Dept: NEUROLOGY | Age: 63
End: 2023-04-07

## 2023-04-07 NOTE — TELEPHONE ENCOUNTER
I called to reschedule appointment on 4/12/23 with Melissa Enriquez, patient wife answered and said she will let him know to call the office back to reschedule.

## 2023-05-03 NOTE — PROGRESS NOTES
[facility] - PODIATRY    Today's Date: 05/11/2023     Patient Name: Dada Tucker  MRN: [id number]  [facility]: [id number]  PCP: Gigi Grullon MD  Referring Provider: Maco Tucker MD    SUBJECTIVE     Chief Complaint   Patient presents with   • Establish Care     Gigi Grullon MD  -TINEA UNGUIUM.- pt states needing new braces and shoes for feet.- pt denies pain due to neuropathy   • Diabetes     A1C is 5.9 at present     HPI: Dada Tucker, a 62 y.o.male, comes to clinic as a(n) new patient presenting for diabetic foot exam and complaining of Foot drop. Patient has h/o Anemia, arthritis, cervical spine stenosis, CKD, COPD, GERD, gout, hyperlipidemia, hypertension, idiopathic peripheral neuropathy, narcolepsy, sleep apnea, psoriasis, psoriatic arthritis, PTSD, restless leg syndrome, TIA, RA, vitamin B12 deficiency, vitamin D deficiency, diabetes. Patient states that his last A1c was 5.9%.  Patient presents for diabetic foot exam and nail care.  States that he was previously seeing another podiatrist prior to [facility], however, has not been seen by them since that time.  States that he did see another podiatrist, however, did not return after his initial evaluation as the podiatrist stated that he did not need bracing for his feet.  Patient states that he has had lower extremity braces for many years due to foot drop.  Patient is requesting new order for AFOs as [facility].  States that he has almost full numbness in his feet secondary to neuropathy that is due to chemical exposure.  Patient follows with vascular surgery for lymphedema and venous stasis.  Has not been seen in over a year. Denies pain. Relates previous treatment(s) including Care by podiatry. Denies any constitutional symptoms. No other pedal complaints at this time.    Past Medical History:   Diagnosis Date   • Allergic rhinitis    • Allergic urticaria    • Anemia    • Arthritis    •  Asthma    • Callus    • Cervical stenosis of spine    • Chronic kidney disease    • COPD (chronic obstructive pulmonary disease)    • Difficulty walking    • Dysthymia    • GERD (gastroesophageal reflux disease)    • Gout    • Hammer toe    • High arches    • Hyperlipidemia    • Hypertension    • Hypogonadism male    • Idiopathic peripheral neuropathy    • Liver disease    • Narcolepsy    • Neuropathy in diabetes    • KHALIF (obstructive sleep apnea)    • Psoriasis    • Psoriatic arthritis    • PTSD (post-traumatic stress disorder)    • Recurrent major depressive disorder    • Restless leg    • Rheumatoid arthritis    • TIA (transient ischemic attack)    • Type 2 diabetes mellitus    • Vitamin B 12 deficiency    • Vitamin D deficiency      Past Surgical History:   Procedure Laterality Date   • CARPAL TUNNEL RELEASE  2005   • HEMORROIDECTOMY  2001   • RHINOPLASTY  2003   • SEPTOPLASTY, RESECTION INFERIOR TURBINATES N/A 10/13/2017    Procedure: SEPTOPLASTY, RESECTION INFERIOR TURBINATES viaa coblation;  Surgeon: Cedrick Carolina MD;  Location: [facility];  Service:    • TOENAIL EXCISION     • UVULOPALATOPHARYNGOPLASTY  2003     Family History   Problem Relation Age of Onset   • Diabetes Mother    • Cancer Mother    • Hypertension Mother    • Diabetes Father    • Hypertension Father    • Stroke Father    • Alcohol abuse Brother    • Drug abuse Brother      Social History     Socioeconomic History   • Marital status:    Tobacco Use   • Smoking status: Never   • Smokeless tobacco: Never   Vaping Use   • Vaping Use: Never used   Substance and Sexual Activity   • Alcohol use: No   • Drug use: No   • Sexual activity: Defer     Allergies   Allergen Reactions   • Ampicillin Anaphylaxis   • Ciprofloxacin Anaphylaxis   • Levaquin [Levofloxacin] Anaphylaxis   • Penicillins Anaphylaxis   • Atorvastatin Other (See Comments)     Chest pain, weakness   • Crestor [Rosuvastatin Calcium] Other (See Comments)     Chest pain ,  "weakness   • Flonase [Fluticasone Propionate] Other (See Comments)     Depression    • Fluvastatin    • Gemfibrozil    • Niacin And Related Other (See Comments)     Facial flushing   • Pravastatin Other (See Comments)     Chest pain, weakness   • Simvastatin Other (See Comments)     \"chest pain\"   weakness   • Yellow Dyes (Non-Tartrazine) Other (See Comments)     \"disoriented\"     Current Outpatient Medications   Medication Sig Dispense Refill   • albuterol (PROVENTIL HFA;VENTOLIN HFA) 108 (90 Base) MCG/ACT inhaler Inhale 2 puffs Every 4 (Four) Hours As Needed for Wheezing.     • allopurinol (ZYLOPRIM) 100 MG tablet Take 1 tablet by mouth Every 12 (Twelve) Hours.     • aspirin 81 MG EC tablet Take 1 tablet by mouth Daily.     • baclofen (LIORESAL) 10 MG tablet Take 1 tablet by mouth As Needed for Muscle Spasms.     • bumetanide (BUMEX) 1 MG tablet Take 1 tablet by mouth Daily.     • Cholecalciferol (VITAMIN D3) 3000 units tablet Take 2,000 mg by mouth Every Night.     • ezetimibe (ZETIA) 10 MG tablet Take 1 tablet by mouth Every Night.     • gabapentin (NEURONTIN) 400 MG capsule Take 1 capsule by mouth 3 (Three) Times a Day.     • hydrALAZINE (APRESOLINE) 25 MG tablet Take 1 tablet by mouth 3 (Three) Times a Day.     • insulin glargine (LANTUS) 100 UNIT/ML injection Inject 50 Units under the skin into the appropriate area as directed Daily With Lunch.     • lisinopril (PRINIVIL,ZESTRIL) 40 MG tablet Take 1 tablet by mouth Daily. 20mg at lunch     • NIFEdipine XL (PROCARDIA XL) 30 MG 24 hr tablet Take 1 tablet by mouth 2 (two) times a day.     • traMADol (ULTRAM) 50 MG tablet Take 1 tablet by mouth Every Night.     • vitamin B-12 (CYANOCOBALAMIN) 1000 MCG tablet Take 1 tablet by mouth Daily.     • FLUoxetine (PROzac) 40 MG capsule Take 40 mg by mouth Daily. 40 mg at lunch     • sulfamethoxazole-trimethoprim (BACTRIM DS,SEPTRA DS) 800-160 MG per tablet   0     No current facility-administered medications for this " visit.     Review of Systems   Constitutional: Negative for chills and fever.   HENT: Negative for congestion.    Respiratory: Negative for shortness of breath.    Cardiovascular: Positive for leg swelling. Negative for chest pain.   Gastrointestinal: Negative for constipation, diarrhea, nausea and vomiting.   Musculoskeletal: Positive for arthralgias and gait problem. Negative for myalgias.   Skin: Positive for color change. Negative for wound.   Neurological: Positive for numbness.       OBJECTIVE     Vitals:    23 1314   BP: 144/58   Pulse: 67   SpO2: 98%       PHYSICAL EXAM  GEN:   Accompanied by none.     Foot/Ankle Exam    GENERAL  Diabetic foot exam performed    Appearance:  obese  Orientation:  AAOx3  Affect:  appropriate  Gait:  shuffling  Assistance:  cane use  Right shoe gear: diabetic shoe (with AFO)  Left shoe gear: diabetic shoe (with AFO)    VASCULAR     Right Foot Vascularity   Dorsalis pedis:  2+  Posterior tibial:  2+  Skin temperature:  warm  Edema gradin+, non-pitting and pitting  CFT:  3  Pedal hair growth:  Absent  Varicosities:  mild varicosities     Left Foot Vascularity   Dorsalis pedis:  2+  Posterior tibial:  2+  Skin temperature:  warm  Edema gradin+, pitting and non-pitting  CFT:  3  Pedal hair growth:  Absent  Varicosities:  mild varicosities     NEUROLOGIC     Right Foot Neurologic   Light touch sensation: absent  Vibratory sensation: absent  Hot/Cold sensation: absent  Protective Sensation using [place] Monofilament:   Right Foot Sites Intact: 0.  Sites tested: 10     Left Foot Neurologic   Light touch sensation: absent  Vibratory sensation: absent  Hot/Cold sensation:  absent  Protective Sensation using [place] Monofilament:   Left Foot Sites Intact: 0.  Sites tested: 10    MUSCULOSKELETAL     Right Foot Musculoskeletal   Tenderness:  none    Arch:  Normal  Hammertoe:  Second toe and fifth toe  Mallet toe:  First toe  Hallux valgus: No    Tailor's  bunion: No       Left Foot Musculoskeletal   Tenderness:  none  Arch:  Normal  Hammertoe:  Second toe, third toe, fifth toe and fourth toe  Mallet Toe:  First toe  Hallux valgus: No    Tailor's bunion: No      MUSCLE STRENGTH     Right Foot Muscle Strength   Foot dorsiflexion:  4-  Foot plantar flexion:  4-  Foot inversion:  4-  Foot eversion:  4-     Left Foot Muscle Strength   Foot dorsiflexion:  4-  Foot plantar flexion:  4-  Foot inversion:  4-  Foot eversion:  4-    RANGE OF MOTION     Right Foot Range of Motion   Foot and ankle ROM within normal limits       Left Foot Range of Motion   Foot and ankle ROM within normal limits      DERMATOLOGIC      Right Foot Dermatologic   Skin  Positive for dryness and skin changes. (Hemosiderin deposits)  Nails  1.  Absent.  2.  Positive for elongated, onychomycosis and dystrophic nail.  3.  Positive for elongated, onychomycosis and dystrophic nail.  4.  Positive for elongated, onychomycosis and dystrophic nail.  5.  Positive for elongated, onychomycosis and dystrophic nail.     Left Foot Dermatologic   Skin  Positive for dryness and skin changes. (Hemosiderin deposits)   Nails  1.  Absent.  2.  Positive for elongated, onychomycosis and dystrophic nail.  3.  Positive for elongated, onychomycosis and dystrophic nail.  4.  Positive for elongated, onychomycosis and dystrophic nail.  5.  Positive for elongated, onychomycosis, subungual debris and dystrophic nail.      RADIOLOGY/NUCLEAR:  No results found.    LABORATORY/CULTURE RESULTS:      PATHOLOGY RESULTS:       ASSESSMENT/PLAN     Diagnoses and all orders for this visit:    1. Nail dystrophy (Primary)    2. Onychomycosis    3. Bilateral foot-drop    4. 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 Diabetes mellitus due to underlying condition with stage 3 chronic kidney disease, unspecified whether long term insulin use, unspecified whether stage 3a or 3b CKD    6. Encounter for diabetic foot exam    7. Hammer toes, bilateral    8. Gait  abnormality      Comprehensive lower extremity examination and evaluation was performed.  Discussed findings and treatment plan including risks, benefits, and treatment options with patient in detail. Patient agreed with treatment plan.  Diabetic foot exam performed.  After verbal consent obtained, nail(s) x8 debrided of length and thickness with nail nipper without incidence  Patient may maintain nails and calluses at home utilizing emery board or pumice stone between visits as needed  Reviewed at home diabetic foot care including daily foot checks   New RX for AFO per [employer] - continue current style bracing  Continue diabetic monitoring and control under direction of PCP.   Continue use of cane for gait support.   Continue vascular surgery follow-ups- recommended scheduling new appointment today.  An After Visit Summary was printed and given to the patient at discharge, including (if requested) any available informative/educational handouts regarding diagnosis, treatment, or medications. All questions were answered to patient/family satisfaction. Should symptoms fail to improve or worsen they agree to call or return to clinic or to go to the Emergency Department. Discussed the importance of following up with any needed screening tests/labs/specialist appointments and any requested follow-up recommended by me today. Importance of maintaining follow-up discussed and patient accepts that missed appointments can delay diagnosis and potentially lead to worsening of conditions.  Return in about 3 months (around 8/11/2023)., or sooner if acute issues arise.    Lab Frequency Next Occurrence   Follow Anesthesia Guidelines / Standing Orders Once 08/29/2017       This document has been electronically signed by JANIE Ortiz on May 11, 2023 13:47 CDT

## 2023-05-10 ENCOUNTER — TELEPHONE (OUTPATIENT)
Dept: PODIATRY | Facility: CLINIC | Age: 63
End: 2023-05-10
Payer: MEDICARE

## 2023-05-11 ENCOUNTER — OFFICE VISIT (OUTPATIENT)
Dept: PODIATRY | Facility: CLINIC | Age: 63
End: 2023-05-11
Payer: OTHER GOVERNMENT

## 2023-05-11 VITALS
OXYGEN SATURATION: 98 % | BODY MASS INDEX: 39.17 KG/M2 | WEIGHT: 315 LBS | HEIGHT: 75 IN | HEART RATE: 67 BPM | DIASTOLIC BLOOD PRESSURE: 58 MMHG | SYSTOLIC BLOOD PRESSURE: 144 MMHG

## 2023-05-11 DIAGNOSIS — M20.41 HAMMER TOES, BILATERAL: ICD-10-CM

## 2023-05-11 DIAGNOSIS — N18.30 DIABETES MELLITUS DUE TO UNDERLYING CONDITION WITH STAGE 3 CHRONIC KIDNEY DISEASE, UNSPECIFIED WHETHER LONG TERM INSULIN USE, UNSPECIFIED WHETHER STAGE 3A OR 3B CKD: ICD-10-CM

## 2023-05-11 DIAGNOSIS — R26.9 GAIT ABNORMALITY: ICD-10-CM

## 2023-05-11 DIAGNOSIS — L60.3 NAIL DYSTROPHY: Primary | ICD-10-CM

## 2023-05-11 DIAGNOSIS — E08.22 DIABETES MELLITUS DUE TO UNDERLYING CONDITION WITH STAGE 3 CHRONIC KIDNEY DISEASE, UNSPECIFIED WHETHER LONG TERM INSULIN USE, UNSPECIFIED WHETHER STAGE 3A OR 3B CKD: ICD-10-CM

## 2023-05-11 DIAGNOSIS — E11.9 ENCOUNTER FOR DIABETIC FOOT EXAM: ICD-10-CM

## 2023-05-11 DIAGNOSIS — B35.1 ONYCHOMYCOSIS: ICD-10-CM

## 2023-05-11 DIAGNOSIS — G62.0 POLYNEUROPATHY DUE TO DRUG: ICD-10-CM

## 2023-05-11 DIAGNOSIS — M20.42 HAMMER TOES, BILATERAL: ICD-10-CM

## 2023-05-11 DIAGNOSIS — M21.371 BILATERAL FOOT-DROP: ICD-10-CM

## 2023-05-11 DIAGNOSIS — M21.372 BILATERAL FOOT-DROP: ICD-10-CM

## 2023-05-15 ENCOUNTER — TELEPHONE (OUTPATIENT)
Dept: NEUROLOGY | Age: 63
End: 2023-05-15

## 2023-05-15 ENCOUNTER — OFFICE VISIT (OUTPATIENT)
Dept: NEUROLOGY | Age: 63
End: 2023-05-15
Payer: OTHER GOVERNMENT

## 2023-05-15 VITALS
BODY MASS INDEX: 38.36 KG/M2 | OXYGEN SATURATION: 96 % | WEIGHT: 315 LBS | DIASTOLIC BLOOD PRESSURE: 64 MMHG | HEIGHT: 76 IN | HEART RATE: 64 BPM | SYSTOLIC BLOOD PRESSURE: 130 MMHG

## 2023-05-15 DIAGNOSIS — R41.3 MEMORY LOSS: Primary | ICD-10-CM

## 2023-05-15 DIAGNOSIS — G31.84 MCI (MILD COGNITIVE IMPAIRMENT): ICD-10-CM

## 2023-05-15 PROCEDURE — 99214 OFFICE O/P EST MOD 30 MIN: CPT | Performed by: NURSE PRACTITIONER

## 2023-05-15 PROCEDURE — G8417 CALC BMI ABV UP PARAM F/U: HCPCS | Performed by: NURSE PRACTITIONER

## 2023-05-15 PROCEDURE — G8427 DOCREV CUR MEDS BY ELIG CLIN: HCPCS | Performed by: NURSE PRACTITIONER

## 2023-05-15 PROCEDURE — 3017F COLORECTAL CA SCREEN DOC REV: CPT | Performed by: NURSE PRACTITIONER

## 2023-05-15 PROCEDURE — 1036F TOBACCO NON-USER: CPT | Performed by: NURSE PRACTITIONER

## 2023-05-15 NOTE — PROGRESS NOTES
REVIEW OF SYSTEMS    Constitutional: []Fever []Sweat []Chills [] Recent Injury [x] Denies all unless marked  HEENT:[]Headache  [] Head Injury/Hearing Loss  [] Sore Throat  [] Ear Ache/Dizziness  [x] Denies all unless marked  Spine:  [x] Neck pain  [x] Back pain  [] Sciaticia  [x] Denies all unless marked  Cardiovascular:[]Heart Disease []Chest Pain [] Palpitations  [x] Denies all unless marked  Pulmonary: []Shortness of Breath []Cough   [x] Denies all unless marke  Gastrointestinal: [x]Nausea  []Vomiting  []Abdominal Pain  []Constipation  []Diarrhea  []Dark Bloody Stools  [x] Denies all unless marked  Psychiatric/Behavioral:[] Depression [x] Anxiety [x] Denies all unless marked  Genitourinary:   [] Frequency  [] Urgency  [] Incontinence [] Pain with Urination  [x] Denies all unless marked  Extremities: []Pain  [x]Swelling  [x] Denies all unless marked  Musculoskeletal: [x] Muscle Pain  [x] Joint Pain  [] Arthritis [] Muscle Cramps [] Muscle Twitches  [x] Denies all unless marked  Sleep: [] Insomnia [] Snoring [] Restless Legs [x] Sleep Apnea  [] Daytime Sleepiness  [x] Denies all unless marked  Skin:[] Rash [] Skin Discoloration [x] Denies all unless marked   Neurological: [x]Visual Disturbance/Memory Loss [] Loss of Balance [] Slurred Speech/Weakness [] Seizures  [x] Vertigo/Dizziness [x] Denies all unless marked
suggesting stenosis in the 50 to 69% range, nerve conduction study/EMG noting distal symmetric sensorimotor polyneuropathy, ulnar nerve compromise, mild compromise of the median nerve bilaterally. MRI brain completed is largely normal apart from mild atrophy associated with aging. As discussed at prior visit MoCA test is unreliable as patient has taken this test multiple times. It is of note that he has known obstructive sleep apnea. He does wear CPAP at night but is still reporting poor sleep and daytime somnolence. Recently had sleep study completed, has follow-up with sleep this month. He also feels like he is having restless legs as well. He does have history of TIA last year feels this is when memory issues really started. On exam there is a bilateral intention tremor, slowed ANTON bilaterally, decreased right arm swing with gait. Feel there are underlying signs of Parkinson's. He denies family history of PD. Question if he is experiencing MCI related to aging process. Will need to be sure his sleep apnea is well controlled. Also need to continue to clinically follow him for further signs of Parkinson's disease. ICD-10-CM    1. Memory loss  R41.3 Heavy Metal Blood Panel      2. MCI (mild cognitive impairment)  G31.84 Heavy Metal Blood Panel          PLAN:  Continue Namenda 10 mg daily. Unable to titrate further due to renal function. New nephrologist needed. Keep follow-up with vascular as scheduled in September. Serial imaging of carotids given plaque buildup. Monitor parkinsonism's clinically for now. Labs as listed today, heavy metals blood panel. 5. Sleep study completed, follow-up with sleep 5/23 as scheduled. 6. Dr. Roro Mcgraw at [facility] outpatient clinic neurocognitive testing -Records of this still needed. 7. Follow-up with hematology as scheduled. 8. Maximize stroke risk factors including blood pressure, glucose, and cholesterol control. Continue daily ASA.   9.  Return in about

## 2023-05-23 ENCOUNTER — OFFICE VISIT (OUTPATIENT)
Dept: NEUROLOGY | Age: 63
End: 2023-05-23
Payer: MEDICARE

## 2023-05-23 VITALS
OXYGEN SATURATION: 96 % | SYSTOLIC BLOOD PRESSURE: 129 MMHG | DIASTOLIC BLOOD PRESSURE: 67 MMHG | HEIGHT: 76 IN | HEART RATE: 66 BPM | BODY MASS INDEX: 38.36 KG/M2 | WEIGHT: 315 LBS

## 2023-05-23 DIAGNOSIS — Z78.9 DIFFICULTY WITH BIPAP USE: ICD-10-CM

## 2023-05-23 DIAGNOSIS — R41.3 MEMORY LOSS: ICD-10-CM

## 2023-05-23 DIAGNOSIS — G47.33 OBSTRUCTIVE SLEEP APNEA: Primary | ICD-10-CM

## 2023-05-23 DIAGNOSIS — R40.0 SOMNOLENCE, DAYTIME: ICD-10-CM

## 2023-05-23 DIAGNOSIS — Z71.2 ENCOUNTER TO DISCUSS TEST RESULTS: ICD-10-CM

## 2023-05-23 DIAGNOSIS — R41.3 MEMORY LOSS: Primary | ICD-10-CM

## 2023-05-23 PROCEDURE — 1036F TOBACCO NON-USER: CPT | Performed by: PHYSICIAN ASSISTANT

## 2023-05-23 PROCEDURE — G8417 CALC BMI ABV UP PARAM F/U: HCPCS | Performed by: PHYSICIAN ASSISTANT

## 2023-05-23 PROCEDURE — 1123F ACP DISCUSS/DSCN MKR DOCD: CPT | Performed by: PHYSICIAN ASSISTANT

## 2023-05-23 PROCEDURE — G8427 DOCREV CUR MEDS BY ELIG CLIN: HCPCS | Performed by: PHYSICIAN ASSISTANT

## 2023-05-23 PROCEDURE — 3017F COLORECTAL CA SCREEN DOC REV: CPT | Performed by: PHYSICIAN ASSISTANT

## 2023-05-23 PROCEDURE — 99215 OFFICE O/P EST HI 40 MIN: CPT | Performed by: PHYSICIAN ASSISTANT

## 2023-05-23 NOTE — PROGRESS NOTES
REVIEW OF SYSTEMS    Constitutional: []Fever []Sweats []Chills [] Recent Injury   [x] Denies all unless marked  HENT:[]Headache  [] Head Injury  [] Sore Throat  [] Ear Pain  [] Dizziness [] Hearing Loss   [x] Denies all unless marked  Musculoskeletal: [] Arthralgia  [] Myalgias [] Muscle cramps  [] Muscle twitches   [x] Denies all unless marked   Spine:  [] Neck pain  [] Back pain  [] Sciatica  [x] Denies all unless marked  Neurological:[] Visual Disturbance [] Double Vision [] Slurred Speech [] Trouble swallowing  [] Vertigo [] Tingling [] Numbness [] Weakness [] Loss of Balance   [] Loss of Consciousness [] Memory Loss [] Seizures  [x] Denies all unless marked  Psychiatric/Behavioral:[] Depression [] Anxiety  [x] Denies all unless marked  Sleep: []  Insomnia [] Sleep Disturbance [] Snoring [] Restless Legs [] Daytime Sleepiness [x] Sleep Apnea  [] Denies all unless marked
all unless marked  HENT:[]Headache  [] Head Injury  [] Sore Throat  [] Ear Pain  [] Dizziness [] Hearing Loss   [x] Denies all unless marked  Musculoskeletal: [] Arthralgia  [] Myalgias [] Muscle cramps  [] Muscle twitches   [x] Denies all unless marked   Spine:  [] Neck pain  [] Back pain  [] Sciatica  [x] Denies all unless marked  Neurological:[] Visual Disturbance [] Double Vision [] Slurred Speech [] Trouble swallowing  [] Vertigo [] Tingling [] Numbness [] Weakness [] Loss of Balance   [] Loss of Consciousness [] Memory Loss [] Seizures  [x] Denies all unless marked  Psychiatric/Behavioral:[] Depression [] Anxiety  [x] Denies all unless marked  Sleep: []  Insomnia [x] Sleep Disturbance [] Snoring [] Restless Legs [x] Daytime Sleepiness [x] Sleep Apnea  [] Denies all unless marked      The MA has completed the ROS with the patient. I have reviewed it in its' entirety with the patient and agree with the documentation. PHYSICAL EXAM  /67   Pulse 66   Ht 6' 4\" (1.93 m)   Wt (!) 326 lb (147.9 kg)   SpO2 96%   BMI 39.68 kg/m²    Neck circumference:  18 inches  Mallampati: Type 4  Constitutional -  Alert in NAD, well developed, pleasant and cooperative with exam  HEENT- Conjunctiva normal.  No scars, masses, or lesions over external nose or ears, hearing intact, no neck masses noted, no jugular vein distension, no bruit  Cardiac- Regular rate and rhythm  Pulmonary- Clear to auscultation, good expansion, normal effort without use of accessory muscles  Musculoskeletal - No significant wasting of muscles noted, no bony deformities  Extremities - No clubbing, cyanosis or edema  Skin - Warm, dry, and intact.   No rash, erythema, or pallor  Psychiatric - Mood, affect, and behavior appear normal      Neurological exam  Awake, alert, fluent oriented x 3 appropriate affect  Attention and concentration appear appropriate  Recent and remote memory appears unremarkable  Speech normal without dysarthria  No clear

## 2023-05-23 NOTE — PATIENT INSTRUCTIONS
long-term follow-up should be established. Annual visits are reasonable, with more frequent visits in between if new issues arise. The purpose of long-term follow-up is to assess usage and monitor for recurrent ARTEMIO, new side effects, air leakage, and fluctuations in body weight. WHERE TO GET MORE INFORMATION -- Your healthcare provider is the best source of information for questions and concerns related to your medical problem. Organizations  American [employer]nea Association  Provides information about sleep apnea to the public, publishes a newsletter, and serves as an advocate for people with the disorder. [place], [address], [address] [place] [address]   Zaire@[email or web address]. org   [employer].. org   Tel: [phone]   Fax: [facility] organization that works to [employer] and safety by promoting public understanding of sleep and sleep disorders. Supports sleep-related education, research, and advocacy; produces and distributes educational materials to the public and healthcare professionals; and offers postdoctoral fellowships and grants for sleep researchers. [address]   Lidia@[email or web address]. org   [email or web address]   Tel: [phone]   Fax: [phone]    Important information:  Medicare/private insurance CPAP/BiPAP/APAP requirements:  Medicare/private insurance has specific requirements for PAP compliance that must be met during the first 90 days of use to continue coverage for CPAP/BiPAP/APAP  from day 91 and beyond. The policy requires that patients use a PAP device 4 hours per 24 hour period, at least 70% of the time over a 30 day period. This data must be downloaded as a report direct from the PAP devices. This is called a compliance download. Your PAP supplier will assist you in this matter.      Note:  Where applicable, we will utilize PAP device efficiency

## 2023-05-25 LAB
ARSENIC BLD-MCNC: <10 UG/L
CADMIUM BLD-MCNC: <1 UG/L
LEAD BLD-MCNC: <2 UG/DL
MERCURY BLD-MCNC: <2.5 UG/L

## 2023-05-31 ENCOUNTER — OFFICE VISIT (OUTPATIENT)
Dept: VASCULAR SURGERY | Facility: CLINIC | Age: 63
End: 2023-05-31
Payer: MEDICARE

## 2023-05-31 VITALS
BODY MASS INDEX: 39.17 KG/M2 | SYSTOLIC BLOOD PRESSURE: 140 MMHG | WEIGHT: 315 LBS | HEIGHT: 75 IN | OXYGEN SATURATION: 97 % | DIASTOLIC BLOOD PRESSURE: 60 MMHG | HEART RATE: 69 BPM

## 2023-05-31 DIAGNOSIS — E78.2 MIXED HYPERLIPIDEMIA: ICD-10-CM

## 2023-05-31 DIAGNOSIS — I89.0 LYMPHEDEMA: Primary | ICD-10-CM

## 2023-05-31 DIAGNOSIS — I10 PRIMARY HYPERTENSION: ICD-10-CM

## 2023-05-31 PROCEDURE — 3078F DIAST BP <80 MM HG: CPT | Performed by: NURSE PRACTITIONER

## 2023-05-31 PROCEDURE — 3077F SYST BP >= 140 MM HG: CPT | Performed by: NURSE PRACTITIONER

## 2023-05-31 PROCEDURE — 1159F MED LIST DOCD IN RCRD: CPT | Performed by: NURSE PRACTITIONER

## 2023-05-31 PROCEDURE — 1160F RVW MEDS BY RX/DR IN RCRD: CPT | Performed by: NURSE PRACTITIONER

## 2023-05-31 PROCEDURE — 99213 OFFICE O/P EST LOW 20 MIN: CPT | Performed by: NURSE PRACTITIONER

## 2023-05-31 NOTE — LETTER
June 7, 2023       No Recipients    Patient: Dada Tucker   YOB: 1960   Date of Visit: 5/31/2023       Dear Dr. Redd Recipients:    Thank you for referring Dada Tucker to me for evaluation. Below are the relevant portions of my assessment and plan of care.    If you have questions, please do not hesitate to call me. I look forward to following Dada along with you.         Sincerely,        JANIE Carlson        CC:   No Recipients    Abena Jaffe APRN  06/07/23 0930  Sign when Signing Visit  05/31/2023       Gigi Grullon MD  [address] [facility] [address]    Dada Tucker  1960    Chief Complaint   Patient presents with   • Follow-up     Pt is here today of leg swelling. PT was last seen on 8/27/21 for Lymphedema. Pt states he stopped using pumps as they no longer worked for him, but when he started using compression socks it helped a little bit. Now pt is stating they are no longer working and the swelling and discoloration has continued to get worst. Pt did use other socks but had a allergic reactions.        Dear Gigi Grullon MD       HPI  I had the pleasure of seeing your patient Dada Tucker in the office today.    As you recall, Dada Tucker is a 63 y.o.  male who we are following for swelling to his lower extremities.  He was last seen in August 2021.  He has been following with Dr. Fleming.  He does report he had a TIA about a year ago with left facial droop, slurred speech, left arm tingling and weakness.  He began wearing compression stockings about 6 months ago which she reports has helped with the swelling.  He also has home lymphedema pumps but he does not use these consistently.  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  I did encourage him to return to the surgeon that performed procedure.      Review of Systems  "  Constitutional: Negative.    HENT: Negative.     Eyes: Negative.    Respiratory: Negative.     Cardiovascular:  Positive for leg swelling.   Gastrointestinal: Negative.    Endocrine: Negative.    Genitourinary: Negative.    Musculoskeletal: Negative.    Skin:  Positive for color change.   Allergic/Immunologic: Negative.    Neurological: Negative.    Hematological: Negative.    Psychiatric/Behavioral: Negative.     All other systems reviewed and are negative.     /60   Pulse 69   Ht 190.5 cm (75\")   Wt (!) 149 kg (329 lb)   SpO2 97%   BMI 41.12 kg/m²     Physical Exam  Vitals and nursing note reviewed.   Constitutional:       Appearance: Normal appearance. He is well-developed. He is obese.   HENT:      Head: Normocephalic and atraumatic.   Eyes:      General: No scleral icterus.     Pupils: Pupils are equal, round, and reactive to light.   Neck:      Thyroid: No thyromegaly.   Cardiovascular:      Rate and Rhythm: Normal rate and regular rhythm.      Heart sounds: Normal heart sounds.   Pulmonary:      Effort: Pulmonary effort is normal.      Breath sounds: Normal breath sounds.   Abdominal:      General: Bowel sounds are normal.      Palpations: Abdomen is soft.   Musculoskeletal:         General: Normal range of motion.      Cervical back: Normal range of motion and neck supple.      Right lower leg: Edema present.      Left lower leg: Edema present.   Skin:     General: Skin is warm and dry.      Comments: Hyperpigmentation   Neurological:      Mental Status: He is alert and oriented to person, place, and time.   Psychiatric:         Mood and Affect: Mood normal.         Behavior: Behavior normal.         Thought Content: Thought content normal.         Judgment: Judgment normal.          Patient Active Problem List   Diagnosis   • KHALIF (obstructive sleep apnea)   • Type 2 diabetes mellitus   • Rheumatoid arthritis   • Restless leg   • Psoriatic arthritis   • Psoriasis   • Hypertension   • " Hyperlipidemia         ICD-10-CM ICD-9-CM   1. Lymphedema  I89.0 457.1   2. Primary hypertension  I10 401.9   3. Mixed hyperlipidemia  E78.2 272.2           Plan: After thoroughly evaluating Dada Tucker, I believe the best course of action is to remain conservative from vascular surgery standpoint.  He is using home compression stockings on a daily basis as well as has home lymphedema pumps.  At this point there is not really anything further that I have to offer from vascular surgery standpoint.  He does follow with Dr. Fleming on a normal basis.  I would recommend he keep his legs elevated when he is not on them.  I did discuss vascular risk factors as they pertain to the progression of vascular disease including controlling his hypertension and hyperlipidemia.  His blood pressure stable on his current medication.  He should continue taking his aspirin 81 mg daily at and Setia 10 mg daily in addition to his other medications.  He can follow-up as needed going forward.  He will reach out to you regarding TIAs.  The patient can continue taking their current medication regimen as previously planned.  This was all discussed in full with complete understanding.    Thank you for allowing me to participate in the care of your patient.  Please do not hesitate with any questions or concerns.  I will keep you aware of any further encounters with Dada Tucker.        Sincerely yours,         JANIE Carlson

## 2023-05-31 NOTE — PROGRESS NOTES
"05/31/2023       Gigi Grullon MD  [address] [facility] [address]    Dada Tucker  1960    Chief Complaint   Patient presents with    Follow-up     Pt is here today of leg swelling. PT was last seen on 8/27/21 for Lymphedema. Pt states he stopped using pumps as they no longer worked for him, but when he started using compression socks it helped a little bit. Now pt is stating they are no longer working and the swelling and discoloration has continued to get worst. Pt did use other socks but had a allergic reactions.        Dear Gigi Grullon MD       HPI  I had the pleasure of seeing your patient Dada Tucker in the office today.    As you recall, Dada Tucker is a 63 y.o.  male who we are following for swelling to his lower extremities.  He was last seen in August 2021.  He has been following with Dr. Fleming.  He does report he had a TIA about a year ago with left facial droop, slurred speech, left arm tingling and weakness.  He began wearing compression stockings about 6 months ago which she reports has helped with the swelling.  He also has home lymphedema pumps but he does not use these consistently.  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  I did encourage him to return to the surgeon that performed procedure.      Review of Systems   Constitutional: Negative.    HENT: Negative.     Eyes: Negative.    Respiratory: Negative.     Cardiovascular:  Positive for leg swelling.   Gastrointestinal: Negative.    Endocrine: Negative.    Genitourinary: Negative.    Musculoskeletal: Negative.    Skin:  Positive for color change.   Allergic/Immunologic: Negative.    Neurological: Negative.    Hematological: Negative.    Psychiatric/Behavioral: Negative.     All other systems reviewed and are negative.     /60   Pulse 69   Ht 190.5 cm (75\")   Wt (!) 149 kg (329 lb)   " SpO2 97%   BMI 41.12 kg/m²     Physical Exam  Vitals and nursing note reviewed.   Constitutional:       Appearance: Normal appearance. He is well-developed. He is obese.   HENT:      Head: Normocephalic and atraumatic.   Eyes:      General: No scleral icterus.     Pupils: Pupils are equal, round, and reactive to light.   Neck:      Thyroid: No thyromegaly.   Cardiovascular:      Rate and Rhythm: Normal rate and regular rhythm.      Heart sounds: Normal heart sounds.   Pulmonary:      Effort: Pulmonary effort is normal.      Breath sounds: Normal breath sounds.   Abdominal:      General: Bowel sounds are normal.      Palpations: Abdomen is soft.   Musculoskeletal:         General: Normal range of motion.      Cervical back: Normal range of motion and neck supple.      Right lower leg: Edema present.      Left lower leg: Edema present.   Skin:     General: Skin is warm and dry.      Comments: Hyperpigmentation   Neurological:      Mental Status: He is alert and oriented to person, place, and time.   Psychiatric:         Mood and Affect: Mood normal.         Behavior: Behavior normal.         Thought Content: Thought content normal.         Judgment: Judgment normal.          Patient Active Problem List   Diagnosis    KHALIF (obstructive sleep apnea)    Type 2 diabetes mellitus    Rheumatoid arthritis    Restless leg    Psoriatic arthritis    Psoriasis    Hypertension    Hyperlipidemia         ICD-10-CM ICD-9-CM   1. Lymphedema  I89.0 457.1   2. Primary hypertension  I10 401.9   3. Mixed hyperlipidemia  E78.2 272.2           Plan: After thoroughly evaluating Dada Tucker, I believe the best course of action is to remain conservative from vascular surgery standpoint.  He is using home compression stockings on a daily basis as well as has home lymphedema pumps.  At this point there is not really anything further that I have to offer from vascular surgery standpoint.  He does follow with Dr. Fleming on a normal basis.  I  would recommend he keep his legs elevated when he is not on them.  I did discuss vascular risk factors as they pertain to the progression of vascular disease including controlling his hypertension and hyperlipidemia.  His blood pressure stable on his current medication.  He should continue taking his aspirin 81 mg daily at and Setia 10 mg daily in addition to his other medications.  He can follow-up as needed going forward.  He will reach out to you regarding TIAs.  The patient can continue taking their current medication regimen as previously planned.  This was all discussed in full with complete understanding.    Thank you for allowing me to participate in the care of your patient.  Please do not hesitate with any questions or concerns.  I will keep you aware of any further encounters with Dada Tucker.        Sincerely yours,         JANIE Carlson

## 2023-06-05 ENCOUNTER — PATIENT MESSAGE (OUTPATIENT)
Dept: NEUROLOGY | Age: 63
End: 2023-06-05

## 2023-06-23 ENCOUNTER — HOSPITAL ENCOUNTER (OUTPATIENT)
Dept: ULTRASOUND IMAGING | Age: 63
Discharge: HOME | End: 2023-06-23
Attending: INTERNAL MEDICINE
Payer: OTHER GOVERNMENT

## 2023-06-23 DIAGNOSIS — N18.32 STAGE 3B CHRONIC KIDNEY DISEASE (HCC): ICD-10-CM

## 2023-06-23 PROCEDURE — 76770 US EXAM ABDO BACK WALL COMP: CPT

## 2023-08-16 ENCOUNTER — OFFICE VISIT (OUTPATIENT)
Dept: NEUROLOGY | Age: 63
End: 2023-08-16
Payer: MEDICARE

## 2023-08-16 VITALS
SYSTOLIC BLOOD PRESSURE: 115 MMHG | HEART RATE: 53 BPM | OXYGEN SATURATION: 98 % | WEIGHT: 315 LBS | HEIGHT: 76 IN | BODY MASS INDEX: 38.36 KG/M2 | DIASTOLIC BLOOD PRESSURE: 58 MMHG

## 2023-08-16 DIAGNOSIS — R25.1 TREMOR: ICD-10-CM

## 2023-08-16 DIAGNOSIS — G31.84 MCI (MILD COGNITIVE IMPAIRMENT): Primary | ICD-10-CM

## 2023-08-16 PROCEDURE — G8427 DOCREV CUR MEDS BY ELIG CLIN: HCPCS | Performed by: NURSE PRACTITIONER

## 2023-08-16 PROCEDURE — 99214 OFFICE O/P EST MOD 30 MIN: CPT | Performed by: NURSE PRACTITIONER

## 2023-08-16 PROCEDURE — 3017F COLORECTAL CA SCREEN DOC REV: CPT | Performed by: NURSE PRACTITIONER

## 2023-08-16 PROCEDURE — 1123F ACP DISCUSS/DSCN MKR DOCD: CPT | Performed by: NURSE PRACTITIONER

## 2023-08-16 PROCEDURE — G8417 CALC BMI ABV UP PARAM F/U: HCPCS | Performed by: NURSE PRACTITIONER

## 2023-08-16 PROCEDURE — 1036F TOBACCO NON-USER: CPT | Performed by: NURSE PRACTITIONER

## 2023-09-27 ENCOUNTER — PATIENT MESSAGE (OUTPATIENT)
Dept: NEUROLOGY | Age: 63
End: 2023-09-27

## 2023-09-29 NOTE — TELEPHONE ENCOUNTER
Jose A, Processor 9/27/2023 7:00 PM CDT    Yes if i get a copy again maybe I can try to walk it to the [facility] and see if they can do anything. I never went to the [facility] [place] in [facility] for my medical care.

## 2024-01-16 ENCOUNTER — OFFICE VISIT (OUTPATIENT)
Dept: NEUROLOGY | Age: 64
End: 2024-01-16
Payer: MEDICARE

## 2024-01-16 VITALS
HEIGHT: 76 IN | BODY MASS INDEX: 38.36 KG/M2 | WEIGHT: 315 LBS | HEART RATE: 78 BPM | DIASTOLIC BLOOD PRESSURE: 78 MMHG | SYSTOLIC BLOOD PRESSURE: 128 MMHG | OXYGEN SATURATION: 97 %

## 2024-01-16 DIAGNOSIS — R25.1 TREMOR: Primary | ICD-10-CM

## 2024-01-16 DIAGNOSIS — R25.1 TREMOR: ICD-10-CM

## 2024-01-16 DIAGNOSIS — R41.3 MEMORY LOSS: ICD-10-CM

## 2024-01-16 PROCEDURE — G8484 FLU IMMUNIZE NO ADMIN: HCPCS | Performed by: NURSE PRACTITIONER

## 2024-01-16 PROCEDURE — 1123F ACP DISCUSS/DSCN MKR DOCD: CPT | Performed by: NURSE PRACTITIONER

## 2024-01-16 PROCEDURE — G8417 CALC BMI ABV UP PARAM F/U: HCPCS | Performed by: NURSE PRACTITIONER

## 2024-01-16 PROCEDURE — 99213 OFFICE O/P EST LOW 20 MIN: CPT | Performed by: NURSE PRACTITIONER

## 2024-01-16 PROCEDURE — 1036F TOBACCO NON-USER: CPT | Performed by: NURSE PRACTITIONER

## 2024-01-16 PROCEDURE — G8427 DOCREV CUR MEDS BY ELIG CLIN: HCPCS | Performed by: NURSE PRACTITIONER

## 2024-01-16 PROCEDURE — 3017F COLORECTAL CA SCREEN DOC REV: CPT | Performed by: NURSE PRACTITIONER

## 2024-01-16 RX ORDER — MEMANTINE HYDROCHLORIDE 10 MG/1
10 TABLET ORAL DAILY
Qty: 60 TABLET | Refills: 5 | Status: SHIPPED | OUTPATIENT
Start: 2024-01-16

## 2024-01-16 RX ORDER — M-VIT,TX,IRON,MINS/CALC/FOLIC 27MG-0.4MG
1 TABLET ORAL DAILY
COMMUNITY

## 2024-01-16 NOTE — PROGRESS NOTES
[facility] Office Note      Patient:   Mychal Paniagua  MR#:    [id number]  Account Number:                         YOB: 1960  Date of Evaluation:  1/17/2024  Time of Note:                          1:19 PM  Primary/Referring Physician:  Mark Hill MD   Consulting Physician:  LEONCIO Yañez    FOLLOW UP      Chief Complaint   Patient presents with    Follow-up       HISTORY OF PRESENT ILLNESS    Mychal Paniauga is a 63 y.o. year old male here for follow up of cognitive problems that began since last 2/21.  Here alone today, lives with wife.  Has had some progressive worsening memory.  Does note that he forgot his wife's birthday for the first time in 20 years.  Still on Namenda 10 mg daily. At prior visit we discussed changes to short-term memory with perhaps long-term involvement.  Was previously being followed at [facility].  There is word recall difficulty along with repetition of questions. There is a degree of mood change with increased irritation but overall mood is well-controlled. There is poor sleep. He has known ARTEMIO; he does use CPAP machine.  Still having ongoing issues related to sleep apnea masks and leaks, was seen by sleep medicine but was overall displeased and has not followed up. He requires no assistance with  ADL's, he is cooking. He is having more issues with handling finances as of late.  Notes he has been late on bills recently that are not on autopay.  Appetite is decreased. Weight has been stable overall. He is driving. He has gotten lost twice in the past. No tickets, accidents. There is bilateral hand tremor, left greater than right.  He is noting some left leg tremor intermittently as well. Tremor is unchanged. There is possibly newer gait abnormality, is still using cane. He is wearing BLE braces for bilateral foot drop, he has had this since 1993. [facility] to [facility] for chronic pain, has had issues with getting a

## 2024-01-16 NOTE — PROGRESS NOTES
REVIEW OF SYSTEMS    Constitutional: []Fever []Sweat []Chills [] Recent Injury [x] Denies all unless marked  HEENT:[]Headache  [] Head Injury/Hearing Loss  [] Sore Throat  [] Ear Ache/Dizziness  [x] Denies all unless marked  Spine:  [] Neck pain  [] Back pain  [] Sciaticia  [x] Denies all unless marked  Cardiovascular:[]Heart Disease []Chest Pain [] Palpitations  [x] Denies all unless marked  Pulmonary: []Shortness of Breath []Cough   [x] Denies all unless marke  Gastrointestinal: []Nausea  []Vomiting  []Abdominal Pain  []Constipation  []Diarrhea  []Dark Bloody Stools  [x] Denies all unless marked  Psychiatric/Behavioral:[] Depression [] Anxiety [x] Denies all unless marked  Genitourinary:   [] Frequency  [] Urgency  [] Incontinence [] Pain with Urination  [x] Denies all unless marked  Extremities: []Pain  []Swelling  [x] Denies all unless marked  Musculoskeletal: [] Muscle Pain  [] Joint Pain  [] Arthritis [] Muscle Cramps [] Muscle Twitches  [x] Denies all unless marked  Sleep: [] Insomnia [] Snoring [] Restless Legs [] Sleep Apnea  [] Daytime Sleepiness  [x] Denies all unless marked  Skin:[] Rash [] Skin Discoloration [x] Denies all unless marked   Neurological: []Visual Disturbance/Memory Loss [] Loss of Balance [] Slurred Speech/Weakness [] Seizures  [] Vertigo/Dizziness [x] Denies all unless marked

## 2024-01-19 ENCOUNTER — TELEPHONE (OUTPATIENT)
Dept: PODIATRY | Facility: CLINIC | Age: 64
End: 2024-01-19
Payer: MEDICARE

## 2024-01-19 LAB — COPPER SERPL-MCNC: 105.3 UG/DL (ref 70–140)

## 2024-01-19 NOTE — TELEPHONE ENCOUNTER
Called patient regarding appt on 01/22/2024. Left message for patient to return call if any questions or concerns arise.

## 2024-01-22 LAB — CERULOPLASMIN SERPL-MCNC: 23 MG/DL (ref 15–30)

## 2024-02-05 ENCOUNTER — TELEPHONE (OUTPATIENT)
Dept: NEUROLOGY | Age: 64
End: 2024-02-05

## 2024-02-05 NOTE — TELEPHONE ENCOUNTER
Patients appointment is needing rescheduled, left a voicemail requesting a call back. Offered an appointment on 2/6/24

## 2024-02-08 ENCOUNTER — TELEPHONE (OUTPATIENT)
Dept: NEUROLOGY | Age: 64
End: 2024-02-08

## 2024-02-08 DIAGNOSIS — R41.89 COGNITIVE CHANGE: ICD-10-CM

## 2024-02-08 DIAGNOSIS — R41.3 MEMORY LOSS: Primary | ICD-10-CM

## 2024-02-08 NOTE — TELEPHONE ENCOUNTER
Patient called leaving a VM stating that he would like to proceed with LP as discussed at his last appointment

## 2024-05-16 ENCOUNTER — OFFICE VISIT (OUTPATIENT)
Dept: NEUROLOGY | Age: 64
End: 2024-05-16

## 2024-05-16 VITALS
WEIGHT: 315 LBS | BODY MASS INDEX: 38.36 KG/M2 | HEART RATE: 68 BPM | HEIGHT: 76 IN | OXYGEN SATURATION: 97 % | DIASTOLIC BLOOD PRESSURE: 64 MMHG | SYSTOLIC BLOOD PRESSURE: 145 MMHG

## 2024-05-16 DIAGNOSIS — R41.3 MEMORY LOSS: ICD-10-CM

## 2024-05-16 DIAGNOSIS — G31.84 MCI (MILD COGNITIVE IMPAIRMENT): Primary | ICD-10-CM

## 2024-05-16 NOTE — PROGRESS NOTES
REVIEW OF SYSTEMS    Constitutional: []Fever []Sweat []Chills [] Recent Injury [x] Denies all unless marked  HEENT:[]Headache  [] Head Injury/Hearing Loss  [] Sore Throat  [] Ear Ache/Dizziness  [x] Denies all unless marked  Spine:  [] Neck pain  [] Back pain  [] Sciaticia  [x] Denies all unless marked  Cardiovascular:[]Heart Disease []Chest Pain [] Palpitations  [x] Denies all unless marked  Pulmonary: []Shortness of Breath []Cough   [x] Denies all unless marke  Gastrointestinal: []Nausea  []Vomiting  []Abdominal Pain  []Constipation  []Diarrhea  []Dark Bloody Stools  [x] Denies all unless marked  Psychiatric/Behavioral:[] Depression [] Anxiety [x] Denies all unless marked  Genitourinary:   [] Frequency  [] Urgency  [] Incontinence [] Pain with Urination  [x] Denies all unless marked  Extremities: []Pain  []Swelling  [x] Denies all unless marked  Musculoskeletal: [] Muscle Pain  [] Joint Pain  [] Arthritis [] Muscle Cramps [x] Muscle Twitches  [x] Denies all unless marked  Sleep: [] Insomnia [] Snoring [] Restless Legs [] Sleep Apnea  [] Daytime Sleepiness  [x] Denies all unless marked  Skin:[] Rash [] Skin Discoloration [x] Denies all unless marked   Neurological: []Visual Disturbance/Memory Loss [] Loss of Balance [] Slurred Speech/Weakness [] Seizures  [] Vertigo/Dizziness [x] Denies all unless marked

## 2024-05-16 NOTE — PROGRESS NOTES
[facility] Office Note      Patient:   Mychal Paniagua  MR#:    [id number]  Account Number:                         YOB: 1960  Date of Evaluation:  5/16/2024  Time of Note:                          3:54 PM  Primary/Referring Physician:  Mark Hill MD   Consulting Physician:  LEONCIO Yañez    FOLLOW UP      Chief Complaint   Patient presents with    Follow-up     Tremor. Memory loss       HISTORY OF PRESENT ILLNESS    Mychal Paniagua is a 63 y.o. year old male here for follow up of cognitive problems that began since in 2021.  Here alone today, lives with wife.  Does feel memory is progressively worsening; forgetting to take medications, appointments. Previously discussed that he forgot his wife's birthday for the first time in 20 years.  Still on Namenda 10 mg daily. At prior visit we discussed changes to short-term memory with perhaps long-term involvement.  Was previously being followed at [facility].  There is word recall difficulty along with repetition of questions. There is a degree of mood change with increased irritation but overall mood is well-controlled. Has had increase of Vibryd which caused brain fog. There is poor sleep. He has known ARTEMIO; he does use CPAP machine.  Has had issues with leaking.  Is now using nasal pillow and taping mouth shut, states his AHI is down to 1-3.  Was previously seen by sleep medicine but was overall displays and has not followed back up. He requires no assistance with  ADL's, he is cooking. He is having more issues with handling finances as of late.  Notes he has been late on bills recently that are not on autopay.  Appetite is decreased. Weight has been stable overall. He is driving. He has gotten lost twice in the past. No tickets, accidents. There is bilateral hand tremor, left greater than right.  He is noting some left leg tremor intermittently as well. Tremor is unchanged. There is possibly newer gait abnormality, is still using

## 2024-05-21 ENCOUNTER — TELEPHONE (OUTPATIENT)
Dept: INTERVENTIONAL RADIOLOGY/VASCULAR | Age: 64
End: 2024-05-21

## 2024-06-10 ENCOUNTER — TELEPHONE (OUTPATIENT)
Dept: INTERVENTIONAL RADIOLOGY/VASCULAR | Age: 64
End: 2024-06-10

## 2024-06-10 NOTE — TELEPHONE ENCOUNTER
Patient notified neuro office she is unable to make apt for LP today.  She is currently hospitalized.  APT cancelled and will reschedule at later date

## 2024-07-12 ENCOUNTER — HOSPITAL ENCOUNTER (OUTPATIENT)
Dept: GENERAL RADIOLOGY | Age: 64
Discharge: HOME | End: 2024-07-12
Payer: OTHER GOVERNMENT

## 2024-07-12 VITALS
RESPIRATION RATE: 20 BRPM | DIASTOLIC BLOOD PRESSURE: 47 MMHG | HEART RATE: 56 BPM | WEIGHT: 315 LBS | BODY MASS INDEX: 39.17 KG/M2 | HEIGHT: 75 IN | OXYGEN SATURATION: 99 % | SYSTOLIC BLOOD PRESSURE: 109 MMHG | TEMPERATURE: 96.7 F

## 2024-07-12 DIAGNOSIS — G31.84 MCI (MILD COGNITIVE IMPAIRMENT): ICD-10-CM

## 2024-07-12 DIAGNOSIS — R41.3 MEMORY LOSS: ICD-10-CM

## 2024-07-12 DIAGNOSIS — R41.89 COGNITIVE CHANGE: ICD-10-CM

## 2024-07-12 LAB
APPEARANCE CSF: CLEAR
C GATTII+NEOFOR DNA CSF QL NAA+NON-PROBE: NOT DETECTED
CLOT EVALUATION CSF: ABNORMAL
CMV DNA CSF QL NAA+NON-PROBE: NOT DETECTED
COLOR CSF: ABNORMAL
E COLI K1 DNA CSF QL NAA+NON-PROBE: NOT DETECTED
ERYTHROCYTE [DISTWIDTH] IN BLOOD BY AUTOMATED COUNT: 13.3 % (ref 11.5–14.5)
EV RNA CSF QL NAA+NON-PROBE: NOT DETECTED
GP B STREP DNA CSF QL NAA+NON-PROBE: NOT DETECTED
HAEM INFLU DNA CSF QL NAA+NON-PROBE: NOT DETECTED
HCT VFR BLD AUTO: 44.7 % (ref 42–52)
HGB BLD-MCNC: 14 G/DL (ref 14–18)
HHV6 DNA CSF QL NAA+NON-PROBE: NOT DETECTED
HSV1 DNA CSF QL NAA+NON-PROBE: NOT DETECTED
HSV2 DNA CSF QL NAA+NON-PROBE: NOT DETECTED
INR PPP: 0.98 (ref 0.88–1.18)
L MONOCYTOG DNA CSF QL NAA+NON-PROBE: NOT DETECTED
MCH RBC QN AUTO: 29 PG (ref 27–31)
MCHC RBC AUTO-ENTMCNC: 31.3 G/DL (ref 33–37)
MCV RBC AUTO: 92.5 FL (ref 80–94)
N MEN DNA CSF QL NAA+NON-PROBE: NOT DETECTED
NO DIFFERENTIAL CSF: ABNORMAL
PARECHOVIRUS A RNA CSF QL NAA+NON-PROBE: NOT DETECTED
PLATELET # BLD AUTO: 240 K/UL (ref 130–400)
PMV BLD AUTO: 10.8 FL (ref 9.4–12.4)
PROT CSF-MCNC: 29 MG/DL (ref 15–45)
PROTHROMBIN TIME: 12.7 SEC (ref 12–14.6)
RBC # BLD AUTO: 4.83 M/UL (ref 4.7–6.1)
RBC CSF: 1850 /CUMM (ref 0–5)
REASON FOR REJECTION: NORMAL
REJECTED TEST: NORMAL
S PNEUM DNA CSF QL NAA+NON-PROBE: NOT DETECTED
SOURCE: NORMAL
TUBE # CSF: ABNORMAL
VZV DNA CSF QL NAA+NON-PROBE: NOT DETECTED
WBC # BLD AUTO: 7.8 K/UL (ref 4.8–10.8)
WBC CSF: 10 /CUMM (ref 0–8)

## 2024-07-12 PROCEDURE — 86789 WEST NILE VIRUS ANTIBODY: CPT

## 2024-07-12 PROCEDURE — 82164 ANGIOTENSIN I ENZYME TEST: CPT

## 2024-07-12 PROCEDURE — 82042 OTHER SOURCE ALBUMIN QUAN EA: CPT

## 2024-07-12 PROCEDURE — 83520 IMMUNOASSAY QUANT NOS NONAB: CPT

## 2024-07-12 PROCEDURE — 87070 CULTURE OTHR SPECIMN AEROBIC: CPT

## 2024-07-12 PROCEDURE — 86652 ENCEPHALTIS EAST EQNE ANBDY: CPT

## 2024-07-12 PROCEDURE — 87205 SMEAR GRAM STAIN: CPT

## 2024-07-12 PROCEDURE — 82040 ASSAY OF SERUM ALBUMIN: CPT

## 2024-07-12 PROCEDURE — 85027 COMPLETE CBC AUTOMATED: CPT

## 2024-07-12 PROCEDURE — 87483 CNS DNA AMP PROBE TYPE 12-25: CPT

## 2024-07-12 PROCEDURE — 86618 LYME DISEASE ANTIBODY: CPT

## 2024-07-12 PROCEDURE — 82784 ASSAY IGA/IGD/IGG/IGM EACH: CPT

## 2024-07-12 PROCEDURE — 86653 ENCEPHALTIS ST LOUIS ANTBODY: CPT

## 2024-07-12 PROCEDURE — 84166 PROTEIN E-PHORESIS/URINE/CSF: CPT

## 2024-07-12 PROCEDURE — 86651 ENCEPHALITIS CALIFORN ANTBDY: CPT

## 2024-07-12 PROCEDURE — 84157 ASSAY OF PROTEIN OTHER: CPT

## 2024-07-12 PROCEDURE — 85610 PROTHROMBIN TIME: CPT

## 2024-07-12 PROCEDURE — 62328 DX LMBR SPI PNXR W/FLUOR/CT: CPT

## 2024-07-12 PROCEDURE — 89050 BODY FLUID CELL COUNT: CPT

## 2024-07-12 PROCEDURE — 86654 ENCEPHALTIS WEST EQNE ANTBDY: CPT

## 2024-07-12 PROCEDURE — 87075 CULTR BACTERIA EXCEPT BLOOD: CPT

## 2024-07-12 ASSESSMENT — PAIN - FUNCTIONAL ASSESSMENT
PAIN_FUNCTIONAL_ASSESSMENT: NONE - DENIES PAIN
PAIN_FUNCTIONAL_ASSESSMENT: NONE - DENIES PAIN

## 2024-07-12 NOTE — PROGRESS NOTES
Patient escorted to holding room 2.  ID verified by name and date of birth. Procedure explained and questions answered.   Blood drawn taken to the lab. Assessment complete, vital signs stable.

## 2024-07-12 NOTE — PROGRESS NOTES
Patient arrived from procedure to radiology holding room. Patient is alert. Patient placed on monitor see vital signs flowsheet. Patient denies any pain at this time. Oriented to room. Procedure site is clean, and dry. Will continue to monitor patient.

## 2024-07-13 LAB
BACTERIA CSF CULT: NORMAL
BACTERIA SPEC ANAEROBE CULT: NORMAL
GRAM STN SPEC: NORMAL

## 2024-07-15 LAB
ACE CSF-CCNC: 0.6 U/L (ref 0–2.5)
ALBUMIN CSF ELPH-MCNC: 17.8 MG/DL (ref 8.4–34.2)
ALBUMIN CSF-MCNC: 17 MG/DL (ref 0–35)
ALBUMIN SERPL-MCNC: 4145 MG/DL (ref 3500–5200)
ALPHA1 GLOB CSF ELPH-MCNC: 0.8 MG/DL (ref 0–3.1)
ALPHA2 GLOB CSF ELPH-MCNC: 2.6 MG/DL (ref 0–5.4)
B-GLOBULIN CSF ELPH-MCNC: 4.2 MG/DL (ref 0–8.1)
GAMMA GLOB CSF ELPH-MCNC: 2 MG/DL (ref 0–5.4)
IGG CSF-MCNC: 2.5 MG/DL (ref 0–6)
IGG SERPL-MCNC: 1155 MG/DL (ref 768–1632)
IGG SYNTH RATE SER+CSF CALC-MRATE: <0 MG/D
IGG/ALB CLEAR SER+CSF-RTO: 0.53 RATIO (ref 0.28–0.66)
IGG/ALB CSF: 0.15 RATIO (ref 0.09–0.25)
PREALB CSF ELPH-MCNC: 1.4 MG/DL (ref 0–3.1)
PROT CSF-MCNC: 28.8 MG/DL (ref 15–45)

## 2024-07-16 ENCOUNTER — OFFICE VISIT (OUTPATIENT)
Dept: NEUROLOGY | Age: 64
End: 2024-07-16
Payer: OTHER GOVERNMENT

## 2024-07-16 VITALS
WEIGHT: 315 LBS | HEART RATE: 61 BPM | SYSTOLIC BLOOD PRESSURE: 122 MMHG | HEIGHT: 76 IN | OXYGEN SATURATION: 96 % | DIASTOLIC BLOOD PRESSURE: 60 MMHG | BODY MASS INDEX: 38.36 KG/M2

## 2024-07-16 DIAGNOSIS — R25.1 TREMOR: ICD-10-CM

## 2024-07-16 DIAGNOSIS — G31.84 MCI (MILD COGNITIVE IMPAIRMENT): Primary | ICD-10-CM

## 2024-07-16 DIAGNOSIS — Z79.899 MEDICATION MANAGEMENT: ICD-10-CM

## 2024-07-16 PROCEDURE — 3017F COLORECTAL CA SCREEN DOC REV: CPT | Performed by: NURSE PRACTITIONER

## 2024-07-16 PROCEDURE — G8427 DOCREV CUR MEDS BY ELIG CLIN: HCPCS | Performed by: NURSE PRACTITIONER

## 2024-07-16 PROCEDURE — 1123F ACP DISCUSS/DSCN MKR DOCD: CPT | Performed by: NURSE PRACTITIONER

## 2024-07-16 PROCEDURE — G8417 CALC BMI ABV UP PARAM F/U: HCPCS | Performed by: NURSE PRACTITIONER

## 2024-07-16 PROCEDURE — 99214 OFFICE O/P EST MOD 30 MIN: CPT | Performed by: NURSE PRACTITIONER

## 2024-07-16 PROCEDURE — 1036F TOBACCO NON-USER: CPT | Performed by: NURSE PRACTITIONER

## 2024-07-16 RX ORDER — DONEPEZIL HYDROCHLORIDE 5 MG/1
5 TABLET, FILM COATED ORAL NIGHTLY
Qty: 90 TABLET | Refills: 1 | Status: SHIPPED | OUTPATIENT
Start: 2024-07-16

## 2024-07-16 NOTE — PROGRESS NOTES
REVIEW OF SYSTEMS    Constitutional: []Fever []Sweat []Chills [] Recent Injury [x] Denies all unless marked  HEENT:[]Headache  [] Head Injury/Hearing Loss  [] Sore Throat  [] Ear Ache/Dizziness  [x] Denies all unless marked  Spine:  [] Neck pain  [x] Back pain  [] Sciaticia  [x] Denies all unless marked  Cardiovascular:[]Heart Disease []Chest Pain [] Palpitations  [x] Denies all unless marked  Pulmonary: []Shortness of Breath []Cough   [x] Denies all unless marke  Gastrointestinal: []Nausea  []Vomiting  []Abdominal Pain  []Constipation  []Diarrhea  []Dark Bloody Stools  [x] Denies all unless marked  Psychiatric/Behavioral:[] Depression [] Anxiety [x] Denies all unless marked  Genitourinary:   [] Frequency  [] Urgency  [] Incontinence [] Pain with Urination  [x] Denies all unless marked  Extremities: []Pain  []Swelling  [x] Denies all unless marked  Musculoskeletal: [] Muscle Pain  [] Joint Pain  [] Arthritis [] Muscle Cramps [] Muscle Twitches  [x] Denies all unless marked  Sleep: [] Insomnia [] Snoring [] Restless Legs [] Sleep Apnea  [] Daytime Sleepiness  [x] Denies all unless marked  Skin:[] Rash [] Skin Discoloration [x] Denies all unless marked   Neurological: [x]Visual Disturbance/Memory Loss [x] Loss of Balance [] Slurred Speech/Weakness [] Seizures  [x] Vertigo/Dizziness [x] Denies all unless marked       
includes time spent by me in the following activities: preparing for the visit, reviewing tests, performing a medically appropriate examination and/or evaluation , counseling and educating the patient/family/caregiver, ordering medications, tests, or procedures, documenting information in the medical record and care coordination.       This dictation was generated by voice recognition computer software.  Although all attempts were made to edit the dictation for accuracy, there may be errors in the transcription that are not intended.

## 2024-07-17 LAB
B. BURGDORFERI VLSE1/PEPC10 ABS, CSF: 0.17 IV
EEEV IGG TITR CSF IF: NORMAL {TITER}
LACV IGG TITR CSF IF: NORMAL {TITER}
SLEV IGG TITR CSF IF: NORMAL {TITER}
WEEV IGG TITR CSF IF: NORMAL {TITER}
WNV IGG CSF-ACNC: 0.08 IV

## 2024-07-19 LAB
BACTERIA CSF CULT: NORMAL
BACTERIA SPEC ANAEROBE CULT: NORMAL
GRAM STN SPEC: NORMAL
P-TAU181/AL BETA42 CSF-RTO: 0.01 {RATIO}
TAU PROT/AL BETA42 CSF-RTO: 0.1

## 2024-09-04 DIAGNOSIS — G31.84 MCI (MILD COGNITIVE IMPAIRMENT): Primary | ICD-10-CM

## 2024-09-04 DIAGNOSIS — R41.3 MEMORY LOSS: ICD-10-CM

## 2024-09-04 RX ORDER — RIVASTIGMINE 4.6 MG/24H
1 PATCH, EXTENDED RELEASE TRANSDERMAL DAILY
Qty: 30 PATCH | Refills: 3 | Status: SHIPPED | OUTPATIENT
Start: 2024-09-04

## 2024-10-17 ENCOUNTER — OFFICE VISIT (OUTPATIENT)
Dept: NEUROLOGY | Age: 64
End: 2024-10-17
Payer: MEDICARE

## 2024-10-17 VITALS
HEART RATE: 58 BPM | DIASTOLIC BLOOD PRESSURE: 64 MMHG | BODY MASS INDEX: 38.36 KG/M2 | OXYGEN SATURATION: 96 % | WEIGHT: 315 LBS | SYSTOLIC BLOOD PRESSURE: 130 MMHG | HEIGHT: 76 IN

## 2024-10-17 DIAGNOSIS — R41.3 MEMORY LOSS: ICD-10-CM

## 2024-10-17 PROCEDURE — 1123F ACP DISCUSS/DSCN MKR DOCD: CPT | Performed by: NURSE PRACTITIONER

## 2024-10-17 PROCEDURE — 99214 OFFICE O/P EST MOD 30 MIN: CPT | Performed by: NURSE PRACTITIONER

## 2024-10-17 PROCEDURE — G8417 CALC BMI ABV UP PARAM F/U: HCPCS | Performed by: NURSE PRACTITIONER

## 2024-10-17 PROCEDURE — G8427 DOCREV CUR MEDS BY ELIG CLIN: HCPCS | Performed by: NURSE PRACTITIONER

## 2024-10-17 PROCEDURE — 3017F COLORECTAL CA SCREEN DOC REV: CPT | Performed by: NURSE PRACTITIONER

## 2024-10-17 PROCEDURE — 1036F TOBACCO NON-USER: CPT | Performed by: NURSE PRACTITIONER

## 2024-10-17 PROCEDURE — G8484 FLU IMMUNIZE NO ADMIN: HCPCS | Performed by: NURSE PRACTITIONER

## 2024-10-17 RX ORDER — MEMANTINE HYDROCHLORIDE 10 MG/1
10 TABLET ORAL DAILY
Qty: 60 TABLET | Refills: 5 | Status: SHIPPED | OUTPATIENT
Start: 2024-10-17

## 2024-10-17 RX ORDER — SERTRALINE HYDROCHLORIDE 25 MG/1
50 TABLET, FILM COATED ORAL
COMMUNITY

## 2024-10-17 NOTE — PROGRESS NOTES
[facility] Office Note      Patient:   Mychal Paniagua  MR#:    [id number]  Account Number:                         YOB: 1960  Date of Evaluation:  10/17/2024  Time of Note:                          2:16 PM  Primary/Referring Physician:  Mark Hill MD   Consulting Physician:  LEONCIO Yañez    FOLLOW UP      Chief Complaint   Patient presents with    Follow-up    Memory Loss     Pt states he feels he is forgetting to do certain steps in daily living, feels like he is \"skipping steps.\"       HISTORY OF PRESENT ILLNESS    Mychal Paniagua is a 64 y.o. year old male here for follow up of cognitive problems ongoing since 2021.  Here alone today, lives with wife. He still feels he is progressing from a memory loss standpoint.  Feels he is forgetting simple steps normal activities, example given of taking a shower and forgetting to wash his hair.  Forgetting to turn down temperature while making a meal resulting in burning food.  Notes he is forgetting days of the weeks. Also notes he is more distracted and will forget what he was doing. Forgetting words to songs he used to always sing. Still seeing shadows, having vivid dreams. Feels that gait is slowing down, notes that wife says this as well. Previously discussed that he forgot his wife's birthday for the first time in 20 years.  Still on Namenda 10 mg daily. At prior visit we discussed changes to short-term memory with perhaps long-term involvement.  Was previously being followed at [facility].  There is word recall difficulty along with repetition of questions. There is a degree of mood change with increased irritation but overall mood is well-controlled. Has had increase of Vibryd which caused brain fog, this was discontinued. There is intermittent poor sleep. He has known ARTEMIO; he does use CPAP machine.  Has had issues with leaking but this has improved.  Was previously seen by sleep medicine but was overall displeased and has not

## 2024-12-10 ENCOUNTER — TELEPHONE (OUTPATIENT)
Dept: SURGERY | Age: 64
End: 2024-12-10

## 2024-12-10 NOTE — TELEPHONE ENCOUNTER
Provider is calling  for status of referral  Please return call to update Patient on the referral status. Mychal preferred call back time is Anytime.    Thank you!

## 2024-12-10 NOTE — TELEPHONE ENCOUNTER
12/10/2024 Spoke with patient let him know I have had referring provider [facility] and [facility] Joe will be coming in January.  Let him know I will call back with appointment as soon as I know schedule.  renée

## 2025-01-02 ENCOUNTER — TELEPHONE (OUTPATIENT)
Dept: SURGERY | Age: 65
End: 2025-01-02

## 2025-01-02 NOTE — TELEPHONE ENCOUNTER
Jo from Dr Hill office called and advised that she had information for this pts referral to gen surgery. Anal rectal fistula. She advised that the [facility] approval [facility] [id number] and it is good from Dec 16 2024 to June 14 2025. Will attempt to give this to gen surgery staff. Jo number for any questions is [phone]  Tiarra saini

## 2025-02-12 ENCOUNTER — TELEPHONE (OUTPATIENT)
Dept: SURGERY | Age: 65
End: 2025-02-12

## 2025-02-12 NOTE — TELEPHONE ENCOUNTER
Called patient left another message for patient to bring imaging from Ct Pelvis done at [facility] in [facility].

## 2025-02-13 ENCOUNTER — OFFICE VISIT (OUTPATIENT)
Dept: SURGERY | Age: 65
End: 2025-02-13
Payer: OTHER GOVERNMENT

## 2025-02-13 VITALS
BODY MASS INDEX: 38.36 KG/M2 | OXYGEN SATURATION: 97 % | HEART RATE: 69 BPM | TEMPERATURE: 97.1 F | HEIGHT: 76 IN | WEIGHT: 315 LBS

## 2025-02-13 DIAGNOSIS — I50.30 HEART FAILURE WITH PRESERVED EJECTION FRACTION, UNSPECIFIED HF CHRONICITY (HCC): ICD-10-CM

## 2025-02-13 DIAGNOSIS — E11.319 TYPE 2 DIABETES MELLITUS WITH RETINOPATHY, WITH LONG-TERM CURRENT USE OF INSULIN, MACULAR EDEMA PRESENCE UNSPECIFIED, UNSPECIFIED LATERALITY, UNSPECIFIED RETINOPATHY SEVERITY (HCC): ICD-10-CM

## 2025-02-13 DIAGNOSIS — G47.33 OBSTRUCTIVE SLEEP APNEA: ICD-10-CM

## 2025-02-13 DIAGNOSIS — I10 BENIGN ESSENTIAL HYPERTENSION: ICD-10-CM

## 2025-02-13 DIAGNOSIS — E66.01 MORBID OBESITY: ICD-10-CM

## 2025-02-13 DIAGNOSIS — K60.322: Primary | ICD-10-CM

## 2025-02-13 DIAGNOSIS — J44.9 CHRONIC OBSTRUCTIVE PULMONARY DISEASE, UNSPECIFIED COPD TYPE (HCC): ICD-10-CM

## 2025-02-13 DIAGNOSIS — Z79.4 TYPE 2 DIABETES MELLITUS WITH RETINOPATHY, WITH LONG-TERM CURRENT USE OF INSULIN, MACULAR EDEMA PRESENCE UNSPECIFIED, UNSPECIFIED LATERALITY, UNSPECIFIED RETINOPATHY SEVERITY (HCC): ICD-10-CM

## 2025-02-13 PROBLEM — N18.2 CHRONIC KIDNEY DISEASE (CKD) STAGE G2/A2, MILDLY DECREASED GLOMERULAR FILTRATION RATE (GFR) BETWEEN 60-89 ML/MIN/1.73 SQUARE METER AND ALBUMINURIA CREATININE RATIO BETWEEN 30-299 MG/G: Status: ACTIVE | Noted: 2021-09-14

## 2025-02-13 PROBLEM — E11.9 TYPE 2 DIABETES MELLITUS (HCC): Status: ACTIVE | Noted: 2025-02-13

## 2025-02-13 PROBLEM — E88.810 METABOLIC SYNDROME X: Status: ACTIVE | Noted: 2022-02-15

## 2025-02-13 PROBLEM — J45.909 ASTHMA WITHOUT STATUS ASTHMATICUS: Status: ACTIVE | Noted: 2024-02-09

## 2025-02-13 PROBLEM — M10.9 GOUT, UNSPECIFIED: Status: ACTIVE | Noted: 2022-02-15

## 2025-02-13 PROBLEM — I65.29 CAROTID ARTERY STENOSIS: Status: ACTIVE | Noted: 2022-06-30

## 2025-02-13 PROCEDURE — 99204 OFFICE O/P NEW MOD 45 MIN: CPT | Performed by: SURGERY

## 2025-02-13 PROCEDURE — 1123F ACP DISCUSS/DSCN MKR DOCD: CPT | Performed by: SURGERY

## 2025-02-13 RX ORDER — ALIROCUMAB 75 MG/ML
1 INJECTION, SOLUTION SUBCUTANEOUS
COMMUNITY
Start: 2024-10-14

## 2025-02-13 RX ORDER — NITROGLYCERIN 0.4 MG/1
0.4 TABLET SUBLINGUAL EVERY 5 MIN PRN
COMMUNITY
Start: 2024-10-14

## 2025-02-13 NOTE — PROGRESS NOTES
[facility]  [facility] GENERAL SURGERY  [address]  [facility] [address]  [phone]     Patient: Mychal Paniagua   YOB: 1960   Date: 2/13/2025         History of Present Illness  Chief Complaint   Patient presents with    New Patient     Anorectal fistula       This is a 64 y.o. male presents today complaining of anal fistula.  He states that in 2023 he was told he did not have a fistula despite symptoms.  In February 2024 he had incision and drainage of an anal abscess.  In April 2024 he had an MRI of the pelvis that demonstrated a Guzman sphincteric fistula.  In June or July 2024 he had a seton placed and then it was subsequently removed.  He currently does have intermittent sharp stabbing pain in the anal area associated with leakage of mucus.  There is also occasional bleeding.  He denies fecal incontinence.  He has had multiple colonoscopies the most recent 2 years ago in which a polyp was removed.    I have reviewed the patient's chart including past visits, office notes, hospital reports, procedures, tests, labs, medications, and other reports.    Past Medical History:   Diagnosis Date    Allergic rhinitis     Anemia     Arthritis     Cancer (HCC)     Chronic kidney disease     COPD (chronic obstructive pulmonary disease) (HCC)     Depression     Diabetes mellitus, type 2 (HCC)     GERD (gastroesophageal reflux disease)     Hearing loss     Hyperlipidemia     Hypertension     Neuromuscular disorder (HCC)     Obesity     Obstructive sleep apnea     2000 in [place]    Peripheral neuropathy     Pneumonia     PTSD (post-traumatic stress disorder)     Rheumatoid arthritis (HCC)     Tuberculosis     Varicella     Visual impairment       Past Surgical History:   Procedure Laterality Date    ABSCESS DRAINAGE Left     thigh    ABSCESS DRAINAGE Right     thigh    BASAL CELL CARCINOMA EXCISION      nose    CARPAL TUNNEL RELEASE Bilateral     COLON SURGERY

## 2025-02-27 ENCOUNTER — ANESTHESIA (OUTPATIENT)
Dept: OPERATING ROOM | Age: 65
End: 2025-02-27
Payer: OTHER GOVERNMENT

## 2025-02-27 ENCOUNTER — HOSPITAL ENCOUNTER (OUTPATIENT)
Age: 65
Setting detail: OUTPATIENT SURGERY
Discharge: HOME | End: 2025-02-27
Attending: SURGERY | Admitting: SURGERY
Payer: OTHER GOVERNMENT

## 2025-02-27 ENCOUNTER — ANESTHESIA EVENT (OUTPATIENT)
Dept: OPERATING ROOM | Age: 65
End: 2025-02-27
Payer: OTHER GOVERNMENT

## 2025-02-27 VITALS
HEIGHT: 76 IN | HEART RATE: 58 BPM | RESPIRATION RATE: 16 BRPM | WEIGHT: 315 LBS | OXYGEN SATURATION: 96 % | BODY MASS INDEX: 38.36 KG/M2 | SYSTOLIC BLOOD PRESSURE: 117 MMHG | TEMPERATURE: 98 F | DIASTOLIC BLOOD PRESSURE: 59 MMHG

## 2025-02-27 DIAGNOSIS — K60.322: Primary | ICD-10-CM

## 2025-02-27 LAB
GLUCOSE BLD-MCNC: 90 MG/DL (ref 70–99)
PERFORMED ON: NORMAL

## 2025-02-27 PROCEDURE — 7100000000 HC PACU RECOVERY - FIRST 15 MIN: Performed by: SURGERY

## 2025-02-27 PROCEDURE — 7100000001 HC PACU RECOVERY - ADDTL 15 MIN: Performed by: SURGERY

## 2025-02-27 PROCEDURE — 2580000003 HC RX 258: Performed by: SURGERY

## 2025-02-27 PROCEDURE — 6360000002 HC RX W HCPCS: Performed by: SURGERY

## 2025-02-27 PROCEDURE — 2720000010 HC SURG SUPPLY STERILE: Performed by: SURGERY

## 2025-02-27 PROCEDURE — 3700000001 HC ADD 15 MINUTES (ANESTHESIA): Performed by: SURGERY

## 2025-02-27 PROCEDURE — 3600000013 HC SURGERY LEVEL 3 ADDTL 15MIN: Performed by: SURGERY

## 2025-02-27 PROCEDURE — 3600000003 HC SURGERY LEVEL 3 BASE: Performed by: SURGERY

## 2025-02-27 PROCEDURE — 46270 REMOVE ANAL FIST SUBQ: CPT | Performed by: SURGERY

## 2025-02-27 PROCEDURE — 2500000003 HC RX 250 WO HCPCS

## 2025-02-27 PROCEDURE — 2709999900 HC NON-CHARGEABLE SUPPLY: Performed by: SURGERY

## 2025-02-27 PROCEDURE — 82962 GLUCOSE BLOOD TEST: CPT

## 2025-02-27 PROCEDURE — 3700000000 HC ANESTHESIA ATTENDED CARE: Performed by: SURGERY

## 2025-02-27 PROCEDURE — 7100000011 HC PHASE II RECOVERY - ADDTL 15 MIN: Performed by: SURGERY

## 2025-02-27 PROCEDURE — 6360000002 HC RX W HCPCS

## 2025-02-27 PROCEDURE — 7100000010 HC PHASE II RECOVERY - FIRST 15 MIN: Performed by: SURGERY

## 2025-02-27 RX ORDER — ONDANSETRON 2 MG/ML
INJECTION INTRAMUSCULAR; INTRAVENOUS
Status: DISCONTINUED | OUTPATIENT
Start: 2025-02-27 | End: 2025-02-27 | Stop reason: SDUPTHER

## 2025-02-27 RX ORDER — OXYCODONE AND ACETAMINOPHEN 5; 325 MG/1; MG/1
1 TABLET ORAL EVERY 6 HOURS PRN
Qty: 12 TABLET | Refills: 0 | Status: SHIPPED | OUTPATIENT
Start: 2025-02-27 | End: 2025-03-02

## 2025-02-27 RX ORDER — SODIUM CHLORIDE 0.9 % (FLUSH) 0.9 %
5-40 SYRINGE (ML) INJECTION PRN
Status: DISCONTINUED | OUTPATIENT
Start: 2025-02-27 | End: 2025-02-27 | Stop reason: HOSPADM

## 2025-02-27 RX ORDER — LIDOCAINE HYDROCHLORIDE 10 MG/ML
INJECTION, SOLUTION INFILTRATION; PERINEURAL
Status: DISCONTINUED | OUTPATIENT
Start: 2025-02-27 | End: 2025-02-27 | Stop reason: SDUPTHER

## 2025-02-27 RX ORDER — SODIUM CHLORIDE 0.9 % (FLUSH) 0.9 %
5-40 SYRINGE (ML) INJECTION EVERY 12 HOURS SCHEDULED
Status: DISCONTINUED | OUTPATIENT
Start: 2025-02-27 | End: 2025-02-27 | Stop reason: HOSPADM

## 2025-02-27 RX ORDER — IPRATROPIUM BROMIDE AND ALBUTEROL SULFATE 2.5; .5 MG/3ML; MG/3ML
1 SOLUTION RESPIRATORY (INHALATION)
Status: DISCONTINUED | OUTPATIENT
Start: 2025-02-27 | End: 2025-02-27 | Stop reason: HOSPADM

## 2025-02-27 RX ORDER — PROPOFOL 10 MG/ML
INJECTION, EMULSION INTRAVENOUS
Status: DISCONTINUED | OUTPATIENT
Start: 2025-02-27 | End: 2025-02-27 | Stop reason: SDUPTHER

## 2025-02-27 RX ORDER — SODIUM CHLORIDE 9 MG/ML
INJECTION, SOLUTION INTRAVENOUS PRN
Status: DISCONTINUED | OUTPATIENT
Start: 2025-02-27 | End: 2025-02-27 | Stop reason: HOSPADM

## 2025-02-27 RX ORDER — DIPHENHYDRAMINE HYDROCHLORIDE 50 MG/ML
12.5 INJECTION INTRAMUSCULAR; INTRAVENOUS
Status: DISCONTINUED | OUTPATIENT
Start: 2025-02-27 | End: 2025-02-27 | Stop reason: HOSPADM

## 2025-02-27 RX ORDER — MEPERIDINE HYDROCHLORIDE 25 MG/ML
12.5 INJECTION INTRAMUSCULAR; INTRAVENOUS; SUBCUTANEOUS
Status: DISCONTINUED | OUTPATIENT
Start: 2025-02-27 | End: 2025-02-27 | Stop reason: HOSPADM

## 2025-02-27 RX ORDER — PROCHLORPERAZINE EDISYLATE 5 MG/ML
5 INJECTION INTRAMUSCULAR; INTRAVENOUS
Status: DISCONTINUED | OUTPATIENT
Start: 2025-02-27 | End: 2025-02-27 | Stop reason: HOSPADM

## 2025-02-27 RX ORDER — ROCURONIUM BROMIDE 10 MG/ML
INJECTION, SOLUTION INTRAVENOUS
Status: DISCONTINUED | OUTPATIENT
Start: 2025-02-27 | End: 2025-02-27 | Stop reason: SDUPTHER

## 2025-02-27 RX ORDER — HYDRALAZINE HYDROCHLORIDE 20 MG/ML
10 INJECTION INTRAMUSCULAR; INTRAVENOUS
Status: DISCONTINUED | OUTPATIENT
Start: 2025-02-27 | End: 2025-02-27 | Stop reason: HOSPADM

## 2025-02-27 RX ORDER — LABETALOL HYDROCHLORIDE 5 MG/ML
10 INJECTION, SOLUTION INTRAVENOUS
Status: DISCONTINUED | OUTPATIENT
Start: 2025-02-27 | End: 2025-02-27 | Stop reason: HOSPADM

## 2025-02-27 RX ORDER — SODIUM CHLORIDE, SODIUM LACTATE, POTASSIUM CHLORIDE, CALCIUM CHLORIDE 600; 310; 30; 20 MG/100ML; MG/100ML; MG/100ML; MG/100ML
INJECTION, SOLUTION INTRAVENOUS CONTINUOUS
Status: DISCONTINUED | OUTPATIENT
Start: 2025-02-27 | End: 2025-02-27 | Stop reason: HOSPADM

## 2025-02-27 RX ORDER — MIDAZOLAM HYDROCHLORIDE 1 MG/ML
INJECTION, SOLUTION INTRAMUSCULAR; INTRAVENOUS
Status: DISCONTINUED | OUTPATIENT
Start: 2025-02-27 | End: 2025-02-27 | Stop reason: SDUPTHER

## 2025-02-27 RX ORDER — BUPIVACAINE HYDROCHLORIDE 2.5 MG/ML
INJECTION, SOLUTION INFILTRATION; PERINEURAL PRN
Status: DISCONTINUED | OUTPATIENT
Start: 2025-02-27 | End: 2025-02-27 | Stop reason: HOSPADM

## 2025-02-27 RX ORDER — HYDROMORPHONE HYDROCHLORIDE 1 MG/ML
0.5 INJECTION, SOLUTION INTRAMUSCULAR; INTRAVENOUS; SUBCUTANEOUS EVERY 5 MIN PRN
Status: DISCONTINUED | OUTPATIENT
Start: 2025-02-27 | End: 2025-02-27 | Stop reason: HOSPADM

## 2025-02-27 RX ORDER — FENTANYL CITRATE 50 UG/ML
INJECTION, SOLUTION INTRAMUSCULAR; INTRAVENOUS
Status: DISCONTINUED | OUTPATIENT
Start: 2025-02-27 | End: 2025-02-27 | Stop reason: SDUPTHER

## 2025-02-27 RX ORDER — HYDROMORPHONE HYDROCHLORIDE 1 MG/ML
0.25 INJECTION, SOLUTION INTRAMUSCULAR; INTRAVENOUS; SUBCUTANEOUS EVERY 5 MIN PRN
Status: DISCONTINUED | OUTPATIENT
Start: 2025-02-27 | End: 2025-02-27 | Stop reason: HOSPADM

## 2025-02-27 RX ORDER — NALOXONE HYDROCHLORIDE 0.4 MG/ML
INJECTION, SOLUTION INTRAMUSCULAR; INTRAVENOUS; SUBCUTANEOUS PRN
Status: DISCONTINUED | OUTPATIENT
Start: 2025-02-27 | End: 2025-02-27 | Stop reason: HOSPADM

## 2025-02-27 RX ADMIN — MIDAZOLAM 2 MG: 1 INJECTION INTRAMUSCULAR; INTRAVENOUS at 07:46

## 2025-02-27 RX ADMIN — LIDOCAINE HYDROCHLORIDE 50 MG: 10 INJECTION, SOLUTION INFILTRATION; PERINEURAL at 07:54

## 2025-02-27 RX ADMIN — ONDANSETRON 4 MG: 2 INJECTION INTRAMUSCULAR; INTRAVENOUS at 08:25

## 2025-02-27 RX ADMIN — PROPOFOL 200 MG: 10 INJECTION, EMULSION INTRAVENOUS at 07:54

## 2025-02-27 RX ADMIN — SUGAMMADEX 300 MG: 100 INJECTION, SOLUTION INTRAVENOUS at 08:31

## 2025-02-27 RX ADMIN — FENTANYL CITRATE 50 MCG: 0.05 INJECTION, SOLUTION INTRAMUSCULAR; INTRAVENOUS at 07:54

## 2025-02-27 RX ADMIN — FENTANYL CITRATE 50 MCG: 0.05 INJECTION, SOLUTION INTRAMUSCULAR; INTRAVENOUS at 08:24

## 2025-02-27 RX ADMIN — SODIUM CHLORIDE, POTASSIUM CHLORIDE, SODIUM LACTATE AND CALCIUM CHLORIDE: 600; 310; 30; 20 INJECTION, SOLUTION INTRAVENOUS at 07:04

## 2025-02-27 RX ADMIN — ROCURONIUM BROMIDE 50 MG: 10 INJECTION, SOLUTION INTRAVENOUS at 07:57

## 2025-02-27 ASSESSMENT — LIFESTYLE VARIABLES: SMOKING_STATUS: 0

## 2025-02-27 ASSESSMENT — PAIN DESCRIPTION - DESCRIPTORS
DESCRIPTORS: ACHING
DESCRIPTORS: ACHING

## 2025-02-27 ASSESSMENT — PAIN - FUNCTIONAL ASSESSMENT
PAIN_FUNCTIONAL_ASSESSMENT: 0-10

## 2025-02-27 NOTE — H&P
Update History & Physical    The patient's History and Physical of February 13, 2025 was reviewed with the patient and I examined the patient. There was no change. The surgical site was confirmed by the patient and me.       Plan: The risks, benefits, expected outcome, and alternative to the recommended procedure have been discussed with the patient. Patient understands and wants to proceed with the procedure.     Electronically signed by Sahil Joe MD on 2/27/2025 at 7:13 AM

## 2025-02-27 NOTE — DISCHARGE INSTRUCTIONS
[facility]  General Surgery/Colorectal Clinic  [phone]    POST FISTULOTOMY INSTRUCTIONS      1. Pressure and dull ache in the rectum and a feeling of impending bowel  movement may occur (May even last up to 24 to 48 hours).    2. Sitz baths should be taken at least 2-3 times a day (warm water only, no  additives).     3. A small amount of staining or blood on pad or with bowel movement is normal up to 2 weeks    4. Resume a regular high fiber diet    5. Avoid taking Aspirin for pain. Extra strength Tylenol is safe to take but if you  have more pain, take the pain medication prescribed by your doctor (this may  cause constipation)    6. Take Miralax and/or Milk of Magnesia to ensure bowel movement (laxative)    7. Difficulty in voiding urine should be brought our attention immediately    8. If you are experiencing severe pain and/or fever symptoms, please contact our office immediately.      9. Resume Aspirin/anticoagulants in 2 days and please follow up in 2 weeks.

## 2025-02-27 NOTE — ANESTHESIA POSTPROCEDURE EVALUATION
Department of Anesthesiology  Postprocedure Note    Patient: Mychal Paniagua  MRN: [id number]  YOB: 1960  Date of evaluation: 2/27/2025    Procedure Summary       Date: 02/27/25 Room / Location: [address]    Anesthesia Start: 0749 Anesthesia Stop: 0848    Procedure: SUBCUTANEOUS FISTULOTOMY Diagnosis:       Anal fistula, complex, persistent      (Anal fistula, complex, persistent [K60.322])    Surgeons: Sahil Joe MD Responsible Provider: Marianna Daly APRN - CRNA    Anesthesia Type: general ASA Status: 3            Anesthesia Type: No value filed.    Carlton Phase I: Carlton Score: 9    Carlton Phase II:      Anesthesia Post Evaluation    Patient location during evaluation: PACU  Patient participation: complete - patient participated  Level of consciousness: awake  Airway patency: patent  Nausea & Vomiting: no nausea  Cardiovascular status: hemodynamically stable  Respiratory status: acceptable and face mask  Hydration status: stable  Comments: /70   Pulse 67   Temp 97.1 °F (36.2 °C)   Resp 12   Ht 1.93 m (6' 4\")   Wt (!) 154.2 kg (340 lb)   SpO2 98%   BMI 41.39 kg/m²     Pain management: adequate    No notable events documented.

## 2025-02-27 NOTE — PROGRESS NOTES
CLINICAL PHARMACY NOTE: MEDS TO BEDS    Total # of Prescriptions Filled: 1   The following medications were delivered to the patient:  Discharge Medication List as of 2/27/2025  9:09 AM        START taking these medications    Details   oxyCODONE-acetaminophen (PERCOCET) 5-325 MG per tablet Take 1 tablet by mouth every 6 hours as needed for Pain for up to 3 days. Intended supply: 3 days. Take lowest dose possible to manage pain Max Daily Amount: 4 tablets, Disp-12 tablet, R-0Normal               Additional Documentation:  Patients family picked up curbside. Paid with cash.

## 2025-02-27 NOTE — OP NOTE
[facility] General Surgery    Patient ID: Mychal Paniagua  64 y.o.  male  YOB: 1960      Brief Operative Report    Pre-operative Diagnosis: Anal fistula    Post-operative Diagnosis: Anal fistula    Procedure: Subcutaneous fistulotomy    Surgeon:  Sahil Joe MD      Anesthesia: General    Findings: Findings:  Infection Present At Time Of Surgery (PATOS) (choose all levels that have infection present):  No infection present  Other Findings: Left anterior lateral subcutaneous fistula        Estimated blood loss: Specimens: Minimal    Complications:  none    Condition:  stable        See dictated operative report for full det    
was achieved.  We then performed a left pudendal nerve block with 0.5% Marcaine 10 mL and then injected 20 mL of 0.5% Marcaine around the fistulotomy site.    The patient was then awakened and returned to the PACU in stable condition.    Sponge, needle, instrument count correct at the end of the case.    Estimated intraoperative blood loss minimal mL.     Mr. Paniagua tolerated his surgery well and he was taken to PACU in  satisfactory condition.          ________________________________  Sahil Joe MD

## 2025-02-27 NOTE — ANESTHESIA PRE PROCEDURE
Department of Anesthesiology  Preprocedure Note       Name:  Mychal Paniagua   Age:  64 y.o.  :  1960                                          MRN:  [id number]         Date:  2025      Surgeon: Surgeon(s):  Sahil Joe MD    Procedure: Procedure(s):  ANAL FISTULOTOMY    Medications prior to admission:   Prior to Admission medications    Medication Sig Start Date End Date Taking? Authorizing Provider   Lysine 1000 MG TABS Take 1 tablet by mouth daily   Yes Dar Alexander MD   nitroGLYCERIN (NITROSTAT) 0.4 MG SL tablet Place 1 tablet under the tongue every 5 minutes as needed 10/14/24  Yes Dar Alexander MD   rivastigmine (EXELON) 4.6 MG/24HR Place 1 patch onto the skin daily 24  Yes Izzy Sheridan APRN - CNP   bumetanide (BUMEX) 1 MG tablet Take 1 tablet by mouth as needed (swelling)   Yes Dar Alexander MD   Cetirizine HCl 10 MG CAPS Take 10 mg by mouth daily   Yes Dar Alexander MD   empagliflozin (JARDIANCE) 25 MG tablet Take 1 tablet by mouth daily   Yes Dar Alexander MD   ezetimibe (ZETIA) 10 MG tablet Take 1 tablet by mouth nightly   Yes Dar Alexander MD   ferrous sulfate (IRON 325) 325 (65 Fe) MG tablet Take 1 tablet by mouth daily (with breakfast)   Yes Dar Alexander MD   hydrALAZINE (APRESOLINE) 25 MG tablet Take 1 tablet by mouth 3 times daily   Yes Dar Alexander MD   insulin aspart (NOVOLOG) 100 UNIT/ML injection vial Inject 4-18 Units into the skin 3 times daily 4 units in the morning, 18 units at lunch, 18 units at bedtime   Yes Dar Alexander MD   insulin glargine (LANTUS) 100 UNIT/ML injection vial Inject 50 Units into the skin 2 times daily   Yes Dar Alexander MD   allopurinol (ZYLOPRIM) 100 MG tablet Take 1 tablet by mouth daily 1/15/20  Yes Dra Alexander MD   colchicine (COLCRYS) 0.6 MG tablet Take 1 tablet by mouth as needed for Pain (gout flare) 23  Yes Dar Alexander MD   gabapentin

## 2025-03-05 ENCOUNTER — TRANSCRIBE ORDERS (OUTPATIENT)
Dept: PHYSICAL THERAPY | Facility: HOSPITAL | Age: 65
End: 2025-03-05
Payer: MEDICARE

## 2025-03-05 DIAGNOSIS — I87.8 CHRONIC VENOUS STASIS: Primary | ICD-10-CM

## 2025-03-18 ENCOUNTER — OFFICE VISIT (OUTPATIENT)
Dept: SURGERY | Age: 65
End: 2025-03-18

## 2025-03-18 VITALS
HEART RATE: 64 BPM | OXYGEN SATURATION: 97 % | BODY MASS INDEX: 38.36 KG/M2 | TEMPERATURE: 97.5 F | WEIGHT: 315 LBS | HEIGHT: 76 IN

## 2025-03-18 DIAGNOSIS — K60.30 ANAL FISTULA: ICD-10-CM

## 2025-03-18 DIAGNOSIS — Z48.89 ENCOUNTER FOR POSTOPERATIVE CARE: ICD-10-CM

## 2025-03-18 DIAGNOSIS — Z09 POSTOP CHECK: Primary | ICD-10-CM

## 2025-03-18 PROCEDURE — 99024 POSTOP FOLLOW-UP VISIT: CPT | Performed by: SURGERY

## 2025-03-18 NOTE — PROGRESS NOTES
[facility]  [facility] GENERAL SURGERY  [address]  [facility] [id number] [phone]     Patient: Mychal Paniagua   YOB: 1960   Date: 3/18/2025         Subjective  Date of last surgery:  2/27/2025   POD: 19       Mychal Paniagua  returns today for a post op visit s/p fistulotomy.  He states that there is itching in the perianal region.  There is no pain.  No bleeding.  No discharge.  No incontinence.    He also states that he has point tenderness at his xiphoid.  No postprandial pain.  No nausea or vomiting.  No bloating.    Medications  Current Outpatient Medications   Medication Sig Dispense Refill    Lysine 1000 MG TABS Take 1 tablet by mouth daily      alirocumab (PRALUENT) 75 MG/ML SOAJ injection pen Inject 1 Application into the skin every 14 days      nitroGLYCERIN (NITROSTAT) 0.4 MG SL tablet Place 1 tablet under the tongue every 5 minutes as needed      rivastigmine (EXELON) 4.6 MG/24HR Place 1 patch onto the skin daily 30 patch 3    bumetanide (BUMEX) 1 MG tablet Take 1 tablet by mouth as needed (swelling)      Cetirizine HCl 10 MG CAPS Take 10 mg by mouth daily      empagliflozin (JARDIANCE) 25 MG tablet Take 1 tablet by mouth daily      ezetimibe (ZETIA) 10 MG tablet Take 1 tablet by mouth nightly      ferrous sulfate (IRON 325) 325 (65 Fe) MG tablet Take 1 tablet by mouth daily (with breakfast)      hydrALAZINE (APRESOLINE) 25 MG tablet Take 1 tablet by mouth 3 times daily      insulin aspart (NOVOLOG) 100 UNIT/ML injection vial Inject 4-18 Units into the skin 3 times daily 4 units in the morning, 18 units at lunch, 18 units at bedtime      insulin glargine (LANTUS) 100 UNIT/ML injection vial Inject 50 Units into the skin 2 times daily      Insulin Pen Needle (PEN NEEDLES 31GX5/16\") 31G X 8 MM MISC 1 each daily      allopurinol (ZYLOPRIM) 100 MG tablet Take 1 tablet by mouth daily      colchicine (COLCRYS) 0.6 MG tablet Take 1 tablet by mouth as

## 2025-03-26 ENCOUNTER — TELEPHONE (OUTPATIENT)
Dept: NEUROLOGY | Age: 65
End: 2025-03-26

## 2025-03-26 NOTE — TELEPHONE ENCOUNTER
Mychal called and stated he got a call from the office up could not understand what the message was that was left for him. I looked in patients chart and didn't see where anyone had called so I asked if I could place the patient on hold and check with the providers MA to see if she might had call the patient. I then checked with Joy and she stated she had called and she just needed patient to bring a compliance report with him at the time of the appointment. I provided the information to the patient and he stated that [facility] [place] and it was over a 2 hour drive for him to get that. I let the patient know I would forward this information to Joy and if she need anything else she would call him back..  Patient thanked me for my help and the call ended.

## 2025-03-26 NOTE — TELEPHONE ENCOUNTER
Spoke with the [facility], waited over 25 minutes, they are going to fax the DME report. But there is no auth for Sleep apnea on file.

## 2025-03-27 NOTE — TELEPHONE ENCOUNTER
[facility] for [facility] to [facility], current authorization  25. Patient has not been seen with Dia Chau since May 23,2023.   Fax# [phone]  Ph [phone]

## 2025-04-04 ENCOUNTER — APPOINTMENT (OUTPATIENT)
Dept: PHYSICAL THERAPY | Facility: HOSPITAL | Age: 65
End: 2025-04-04
Payer: MEDICARE

## 2025-04-09 ENCOUNTER — OFFICE VISIT (OUTPATIENT)
Dept: NEUROLOGY | Age: 65
End: 2025-04-09
Payer: MEDICARE

## 2025-04-09 VITALS
WEIGHT: 315 LBS | DIASTOLIC BLOOD PRESSURE: 55 MMHG | BODY MASS INDEX: 38.36 KG/M2 | OXYGEN SATURATION: 97 % | SYSTOLIC BLOOD PRESSURE: 122 MMHG | HEIGHT: 76 IN | HEART RATE: 67 BPM

## 2025-04-09 DIAGNOSIS — R40.0 SOMNOLENCE, DAYTIME: ICD-10-CM

## 2025-04-09 DIAGNOSIS — Z99.89 BIPAP (BIPHASIC POSITIVE AIRWAY PRESSURE) DEPENDENCE: ICD-10-CM

## 2025-04-09 DIAGNOSIS — G47.33 OBSTRUCTIVE SLEEP APNEA: Primary | ICD-10-CM

## 2025-04-09 PROCEDURE — G8417 CALC BMI ABV UP PARAM F/U: HCPCS | Performed by: PHYSICIAN ASSISTANT

## 2025-04-09 PROCEDURE — 1159F MED LIST DOCD IN RCRD: CPT | Performed by: PHYSICIAN ASSISTANT

## 2025-04-09 PROCEDURE — 3078F DIAST BP <80 MM HG: CPT | Performed by: PHYSICIAN ASSISTANT

## 2025-04-09 PROCEDURE — 1036F TOBACCO NON-USER: CPT | Performed by: PHYSICIAN ASSISTANT

## 2025-04-09 PROCEDURE — 3074F SYST BP LT 130 MM HG: CPT | Performed by: PHYSICIAN ASSISTANT

## 2025-04-09 PROCEDURE — G8427 DOCREV CUR MEDS BY ELIG CLIN: HCPCS | Performed by: PHYSICIAN ASSISTANT

## 2025-04-09 PROCEDURE — 3017F COLORECTAL CA SCREEN DOC REV: CPT | Performed by: PHYSICIAN ASSISTANT

## 2025-04-09 PROCEDURE — 1123F ACP DISCUSS/DSCN MKR DOCD: CPT | Performed by: PHYSICIAN ASSISTANT

## 2025-04-09 PROCEDURE — 99213 OFFICE O/P EST LOW 20 MIN: CPT | Performed by: PHYSICIAN ASSISTANT

## 2025-04-09 NOTE — PROGRESS NOTES
REVIEW OF SYSTEMS    Constitutional: []Fever []Sweats []Chills [] Recent Injury [x] Denies all unless marked  HEENT:[]Headache  [] Head Injury [] Hearing Loss  [] Sore Throat  [] Ear Ache [x] Denies all unless marked  Spine:  [] Neck pain  [] Back pain  [] Sciaticia  [x] Denies all unless marked  Cardiovascular:[]Heart Disease []Palpitations [] Chest Pain   [x] Denies all unless marked  Pulmonary: []Shortness of Breath []Cough   [x] Denies all unless marked  Psychiatric/Behavioral:[] Depression [] Anxiety [x] Denies all unless marked  Gastrointestinal: []Nausea  []Vomiting  []Abdominal Pain  []Constipation  []Diarrhea  [x] Denies all unless marked  Genitourinary:   [] Frequency  [] Urgency  [] Dysuria [] Incontinence  [x] Denies all unless marked  Extremities: []Pain  []Swelling  [x] Denies all unless marked  Musculoskeletal: [] Myalgias  [] Joint Pain  [] Arthritis [] Muscle Cramps [] Muscle Twitches  [x] Denies all unless marked  Sleep: [x]Insomnia[]Snoring []Restless Legs  [x]Sleep Apnea  [x]Daytime Sleepiness  [x] Denies all unless marked  Skin:[] Rash [] Color Change [x] Denies all unless marked   Neurological:[]Visual Disturbance [] Memory Loss []Loss of Balance []Slurred Speech []Weakness []Seizures  [] Dizziness [x] Denies all unless marked

## 2025-04-09 NOTE — PROGRESS NOTES
[facility] Neurology and Sleep Medicine  [facility], [address]  [place] [address]  Phone [phone]  Fax [phone]       [facility] Sleep Follow Up Encounter      Information:   Patient Name: Mychal Paniagua  :   1960  Age:   64 y.o.  MRN:   [id number]  Account #:  [id number]  Today:                25    Provider:  Dia Chau PA-C    Chief Complaint   Patient presents with    Follow-up    Sleep Apnea     DME compliance report in media. Patient states no complaints.         Subjective:   Mychal Paniagua is a 64 y.o. male who has  has a past medical history of Allergic rhinitis, Anemia, Arthritis, Cancer ([facility]), Chronic kidney disease, COPD (chronic obstructive pulmonary disease) ([facility]), Depression, Diabetes mellitus, type 2 ([facility]), GERD (gastroesophageal reflux disease), Hearing loss, Hyperlipidemia, Hypertension, Neuromuscular disorder ([facility]), Obesity, Obstructive sleep apnea, Peripheral neuropathy, Pneumonia, PTSD (post-traumatic stress disorder), Rheumatoid arthritis ([facility]), Tuberculosis, Varicella, and Visual impairment.      Mychal Paniagua comes in for a sleep clinic follow up. We manage ARTEMIO treated with BiPAP. He was diagnosed with ARTEMIO years ago. He had a titration PSG in .     Summary of the CPAP Titration Polysomnogram:     Mr. Paniagua was brought in for a split night study on 3/8/2023.  He did not want to try to sleep without PAP and requested to start a pressure of 17cm.  These parameters defeat the purpose of the study.  He had few events and few desaturations.  He was titrated only to an IPAP of 18cm and EPAP of 14cm, not much different than he started out.  The apnea and hypopnea index on the final pressure was 0.0 events per hour.  The lowest oxygen saturation was 92% on the final settings.  No periodic limb movements were noted.  His EKG showed normal sinus rhythm.  Please see the data sheets for details.    Recommendation per Dr. Rodolfo Jeong:    Use of BiPAP with an IPAP of 18cm

## 2025-04-09 NOTE — PATIENT INSTRUCTIONS
Patient education: Sleep apnea in adults       INTRODUCTION -- Normally during sleep, air moves through the throat and in and out of the lungs at a regular rhythm. In a person with sleep apnea, air movement is periodically diminished or stopped. There are two types of sleep apnea: obstructive sleep apnea and central sleep apnea. In obstructive sleep apnea, breathing is abnormal because of narrowing or closure of the throat. In central sleep apnea, breathing is abnormal because of a change in the breathing control and rhythm.  Sleep apnea is a serious condition that can affect a person's ability to safely perform normal daily activities and can affect long term health. Approximately 25 percent of adults are at risk for sleep apnea of some degree.  Men are more commonly affected than women. Other risk factors include middle and older age, being overweight or obese, and having a small mouth and throat.  This topic review focuses on the most common type of sleep apnea in adults, obstructive sleep apnea (ARTEMIO).    HOW SLEEP APNEA OCCURS -- The throat is surrounded by muscles that control the airway for speaking, swallowing, and breathing. During sleep, these muscles are less active, and this causes the throat to narrow.  In most people, this narrowing does not affect breathing. In others, it can cause snoring, sometimes with reduced or completely blocked airflow.  A completely blocked airway without airflow is called an obstructive apnea. Partial obstruction with diminished airflow is called a hypopnea. A person may have apnea and hypopnea during sleep.  Insufficient breathing due to apnea or hypopnea causes oxygen levels to fall and carbon dioxide to rise. Because the airway is blocked, breathing faster or harder does not help to improve oxygen levels until the airway is reopened. Typically, the obstruction requires the person to awaken to activate the upper airway muscles. Once the airway is opened, the person then

## 2025-04-17 PROBLEM — Z09 POSTOP CHECK: Status: RESOLVED | Noted: 2025-03-18 | Resolved: 2025-04-17

## 2025-04-18 ENCOUNTER — HOSPITAL ENCOUNTER (OUTPATIENT)
Dept: PHYSICAL THERAPY | Facility: HOSPITAL | Age: 65
Setting detail: THERAPIES SERIES
Discharge: HOME | End: 2025-04-18
Payer: MEDICARE

## 2025-04-29 ENCOUNTER — APPOINTMENT (OUTPATIENT)
Dept: PHYSICAL THERAPY | Facility: HOSPITAL | Age: 65
End: 2025-04-29
Payer: MEDICARE

## 2025-05-08 ENCOUNTER — OFFICE VISIT (OUTPATIENT)
Dept: NEUROLOGY | Age: 65
End: 2025-05-08
Payer: MEDICARE

## 2025-05-08 VITALS
HEART RATE: 74 BPM | HEIGHT: 76 IN | WEIGHT: 315 LBS | SYSTOLIC BLOOD PRESSURE: 116 MMHG | BODY MASS INDEX: 38.36 KG/M2 | OXYGEN SATURATION: 99 % | DIASTOLIC BLOOD PRESSURE: 68 MMHG

## 2025-05-08 DIAGNOSIS — G24.9 DYSTONIA OF LEFT HAND: ICD-10-CM

## 2025-05-08 DIAGNOSIS — G31.84 MCI (MILD COGNITIVE IMPAIRMENT): ICD-10-CM

## 2025-05-08 DIAGNOSIS — R41.3 MEMORY LOSS: ICD-10-CM

## 2025-05-08 DIAGNOSIS — L73.9 FOLLICULITIS: Primary | ICD-10-CM

## 2025-05-08 DIAGNOSIS — R25.1 TREMOR: ICD-10-CM

## 2025-05-08 DIAGNOSIS — Z79.899 MEDICATION MANAGEMENT: ICD-10-CM

## 2025-05-08 PROCEDURE — G8427 DOCREV CUR MEDS BY ELIG CLIN: HCPCS | Performed by: NURSE PRACTITIONER

## 2025-05-08 PROCEDURE — 1159F MED LIST DOCD IN RCRD: CPT | Performed by: NURSE PRACTITIONER

## 2025-05-08 PROCEDURE — 99214 OFFICE O/P EST MOD 30 MIN: CPT | Performed by: NURSE PRACTITIONER

## 2025-05-08 PROCEDURE — 3074F SYST BP LT 130 MM HG: CPT | Performed by: NURSE PRACTITIONER

## 2025-05-08 PROCEDURE — 1160F RVW MEDS BY RX/DR IN RCRD: CPT | Performed by: NURSE PRACTITIONER

## 2025-05-08 PROCEDURE — 3078F DIAST BP <80 MM HG: CPT | Performed by: NURSE PRACTITIONER

## 2025-05-08 PROCEDURE — G8417 CALC BMI ABV UP PARAM F/U: HCPCS | Performed by: NURSE PRACTITIONER

## 2025-05-08 PROCEDURE — 1123F ACP DISCUSS/DSCN MKR DOCD: CPT | Performed by: NURSE PRACTITIONER

## 2025-05-08 PROCEDURE — 3017F COLORECTAL CA SCREEN DOC REV: CPT | Performed by: NURSE PRACTITIONER

## 2025-05-08 PROCEDURE — 1036F TOBACCO NON-USER: CPT | Performed by: NURSE PRACTITIONER

## 2025-05-08 NOTE — PROGRESS NOTES
[facility] Office Note      Patient:   Mychal Paniagua  MR#:    [id number]  Account Number:                         YOB: 1960  Date of Evaluation:  5/9/2025  Time of Note:                          10:28 AM  Primary/Referring Physician:  Mark Hill MD   Consulting Physician:  LEONCIO Yañez    FOLLOW UP      Chief Complaint   Patient presents with    Follow-up    Memory Loss       HISTORY OF PRESENT ILLNESS    Mychal Paniagua is a 64 y.o. year old male here for follow up of cognitive problems ongoing since 2021.  Here alone today, lives with wife. He does feel that memory has continued to worsen.  Relates he never got a call from [place], was previously referred for further evaluation.  Reports that psychiatry recently discontinued his memantine and sertraline to reevaluate cognitive function, no clear changes.  He is still on Exelon patch, tolerating well.  Tremor is about the same overall, perhaps improved mildly since stopping sertraline.  Still primarily to left hand.  Also notes left hand will cramp up, described as a dystonia type event, triggered by activity.  Happening several times daily.  Associated pain.  Notes new concern of left inner thigh rash, described as maculopapular, erythematous.  States there is 1 bump that looks \"infected.\"  Has tried clindamycin topical treatment and topical cortisone.  Primary care has looked at this with the recommendation of steroid cream.  Ongoing for about 1 month.    As previously discussed from a memory standpoint he will forget simple things such as steps to normal activities, such as forgetting to wash his hair in the shower, burning food while cooking.  Having trouble keeping up with days of the week. Also notes he is more distracted and will forget what he was doing. Forgetting words to songs he used to always sing. Still seeing shadows, having vivid dreams. Feels that gait is slowing down, notes that wife says this as well.

## 2025-05-09 RX ORDER — MUPIROCIN 20 MG/G
OINTMENT TOPICAL
Qty: 30 G | Refills: 0 | Status: SHIPPED | OUTPATIENT
Start: 2025-05-09 | End: 2025-05-15

## 2025-05-09 RX ORDER — BACLOFEN 10 MG/1
20 TABLET ORAL 3 TIMES DAILY
Qty: 180 TABLET | Refills: 2 | Status: SHIPPED | OUTPATIENT
Start: 2025-05-09 | End: 2025-08-07

## 2025-05-19 ENCOUNTER — TELEPHONE (OUTPATIENT)
Dept: NEUROLOGY | Age: 65
End: 2025-05-19

## 2025-05-19 NOTE — TELEPHONE ENCOUNTER
Spoke with [facility] to let [facility] know that [facility], and hopefully will get that worked and sent to [place].

## 2025-06-09 ENCOUNTER — TELEPHONE (OUTPATIENT)
Age: 65
End: 2025-06-09

## 2025-06-11 ENCOUNTER — OFFICE VISIT (OUTPATIENT)
Age: 65
End: 2025-06-11
Payer: MEDICARE

## 2025-06-11 VITALS — HEIGHT: 76 IN | BODY MASS INDEX: 38.36 KG/M2 | WEIGHT: 315 LBS

## 2025-06-11 DIAGNOSIS — M65.342 TRIGGER FINGER, LEFT RING FINGER: Primary | ICD-10-CM

## 2025-06-11 PROCEDURE — G8427 DOCREV CUR MEDS BY ELIG CLIN: HCPCS | Performed by: NURSE PRACTITIONER

## 2025-06-11 PROCEDURE — 20550 NJX 1 TENDON SHEATH/LIGAMENT: CPT | Performed by: NURSE PRACTITIONER

## 2025-06-11 PROCEDURE — 1123F ACP DISCUSS/DSCN MKR DOCD: CPT | Performed by: NURSE PRACTITIONER

## 2025-06-11 PROCEDURE — G8417 CALC BMI ABV UP PARAM F/U: HCPCS | Performed by: NURSE PRACTITIONER

## 2025-06-11 PROCEDURE — 1036F TOBACCO NON-USER: CPT | Performed by: NURSE PRACTITIONER

## 2025-06-11 PROCEDURE — 1159F MED LIST DOCD IN RCRD: CPT | Performed by: NURSE PRACTITIONER

## 2025-06-11 PROCEDURE — 99203 OFFICE O/P NEW LOW 30 MIN: CPT | Performed by: NURSE PRACTITIONER

## 2025-06-11 PROCEDURE — 3017F COLORECTAL CA SCREEN DOC REV: CPT | Performed by: NURSE PRACTITIONER

## 2025-06-11 RX ORDER — LIDOCAINE HYDROCHLORIDE 10 MG/ML
0.5 INJECTION, SOLUTION EPIDURAL; INFILTRATION; INTRACAUDAL; PERINEURAL ONCE
Status: COMPLETED | OUTPATIENT
Start: 2025-06-11 | End: 2025-06-11

## 2025-06-11 RX ORDER — METOPROLOL TARTRATE 50 MG
TABLET ORAL
COMMUNITY

## 2025-06-11 RX ORDER — BETAMETHASONE SODIUM PHOSPHATE AND BETAMETHASONE ACETATE 3; 3 MG/ML; MG/ML
3 INJECTION, SUSPENSION INTRA-ARTICULAR; INTRALESIONAL; INTRAMUSCULAR; SOFT TISSUE ONCE
Status: COMPLETED | OUTPATIENT
Start: 2025-06-11 | End: 2025-06-11

## 2025-06-11 RX ORDER — SULFAMETHOXAZOLE AND TRIMETHOPRIM 200; 40 MG/5ML; MG/5ML
160 SUSPENSION ORAL 2 TIMES DAILY
COMMUNITY

## 2025-06-11 RX ORDER — MUPIROCIN 2 %
OINTMENT (GRAM) TOPICAL 3 TIMES DAILY PRN
COMMUNITY
Start: 2025-05-09

## 2025-06-11 RX ORDER — CLINDAMYCIN PHOSPHATE AND BENZOYL PEROXIDE 10; 50 MG/G; MG/G
GEL TOPICAL 2 TIMES DAILY
COMMUNITY

## 2025-06-11 RX ADMIN — BETAMETHASONE SODIUM PHOSPHATE AND BETAMETHASONE ACETATE 3 MG: 3; 3 INJECTION, SUSPENSION INTRA-ARTICULAR; INTRALESIONAL; INTRAMUSCULAR; SOFT TISSUE at 14:05

## 2025-06-11 RX ADMIN — LIDOCAINE HYDROCHLORIDE 0.5 ML: 10 INJECTION, SOLUTION EPIDURAL; INFILTRATION; INTRACAUDAL; PERINEURAL at 14:04

## 2025-06-11 NOTE — PROGRESS NOTES
[facility]  [facility] ORTHOPEDICS  [address]  [facility] [id number] [phone]     Patient: Mychal Paniagua   YOB: 1960   Date: 6/11/2025     Chief Complaint   Patient presents with    Left hand     Middle and ring finger       History of Present Illness  Mychal is a right hand dominant 65 y.o. male who presents today for initial evaluation by our office with reports of left ring finger catching and locking causing pain of the ring and the middle fingers.  Patient states that pain and catching and locking has persisted over the course of approximately little over a month.  Of note patient is currently in stage III nonalcoholic liver disease and he is also diabetic with last hemoglobin A1c of 6.4.  Patient is unable to take any anti-inflammatories due to liver disease.      Past Medical History:   Diagnosis Date    Allergic rhinitis     Anemia     Arthritis     Cancer (HCC)     skin    Cerebral artery occlusion with cerebral infarction (HCC)     Chronic kidney disease     COPD (chronic obstructive pulmonary disease) (HCC)     Depression     Diabetes mellitus, type 2 (HCC)     GERD (gastroesophageal reflux disease)     Hearing loss     Hyperlipidemia     Hypertension     Liver disease     Memory disorder     Neuromuscular disorder (HCC)     Obesity     Obstructive sleep apnea     2000 in [place] uses bipap    Peripheral neuropathy     Pneumonia     PTSD (post-traumatic stress disorder)     Rheumatoid arthritis (HCC)     Sleep difficulties     Tremor     Tuberculosis     Varicella     Visual impairment       Past Surgical History:   Procedure Laterality Date    ABSCESS DRAINAGE Left     thigh    ABSCESS DRAINAGE Right     thigh    ANUS SURGERY N/A 2/27/2025    SUBCUTANEOUS FISTULOTOMY performed by Sahil Joe MD at [facility] OR    BASAL CELL CARCINOMA EXCISION      nose    CARPAL TUNNEL RELEASE Bilateral     COLON SURGERY      HEMORRHOID

## 2025-08-13 ENCOUNTER — OFFICE VISIT (OUTPATIENT)
Age: 65
End: 2025-08-13

## 2025-08-13 VITALS — HEIGHT: 76 IN | BODY MASS INDEX: 38.36 KG/M2 | WEIGHT: 315 LBS

## 2025-08-13 DIAGNOSIS — M65.342 TRIGGER RING FINGER OF LEFT HAND: Primary | ICD-10-CM

## 2025-08-13 RX ORDER — LIDOCAINE HYDROCHLORIDE 10 MG/ML
0.5 INJECTION, SOLUTION EPIDURAL; INFILTRATION; INTRACAUDAL; PERINEURAL ONCE
Status: COMPLETED | OUTPATIENT
Start: 2025-08-13 | End: 2025-08-13

## 2025-08-13 RX ORDER — BETAMETHASONE SODIUM PHOSPHATE AND BETAMETHASONE ACETATE 3; 3 MG/ML; MG/ML
3 INJECTION, SUSPENSION INTRA-ARTICULAR; INTRALESIONAL; INTRAMUSCULAR; SOFT TISSUE ONCE
Status: COMPLETED | OUTPATIENT
Start: 2025-08-13 | End: 2025-08-13

## 2025-08-13 RX ADMIN — BETAMETHASONE SODIUM PHOSPHATE AND BETAMETHASONE ACETATE 3 MG: 3; 3 INJECTION, SUSPENSION INTRA-ARTICULAR; INTRALESIONAL; INTRAMUSCULAR; SOFT TISSUE at 14:00

## 2025-08-13 RX ADMIN — LIDOCAINE HYDROCHLORIDE 0.5 ML: 10 INJECTION, SOLUTION EPIDURAL; INFILTRATION; INTRACAUDAL; PERINEURAL at 13:59

## (undated) DEVICE — DEFOGGER!" ANTI FOG KIT: Brand: DEROYAL

## (undated) DEVICE — CODMAN® SURGICAL PATTIES 1/2" X 3" (1.27CM X 7.62CM): Brand: CODMAN®

## (undated) DEVICE — SUT GUT PLN 4/0 P3 18IN 1644G

## (undated) DEVICE — SEALER LAP SM L18.8CM OPN JAW HAND/FOOT SWCH FORCETRIAD

## (undated) DEVICE — CURAVIEW LED LARYNGOSCOPE BLADE & HANDLE,DISPOSABLE,MAC 4: Brand: CURAPLEX

## (undated) DEVICE — GLV SURG BIOGEL M LTX PF 7 1/2

## (undated) DEVICE — SUT GUT CHRM 4/0 P3 18IN 1654G

## (undated) DEVICE — DRAPE, ROBOTICS, WITH LEGGINGS, WITH POU: Brand: MEDLINE

## (undated) DEVICE — BLADE SURG NO11 C STL RETRCT DISPOSABLE

## (undated) DEVICE — PK ENT HD AND NK 30

## (undated) DEVICE — GLOVE SURG SZ 8 CRM LTX FREE POLYISOPRENE POLYMER BEAD ANTI

## (undated) DEVICE — WIPE MEROCEL 3.625X3IN

## (undated) DEVICE — CATHETER ETER IV 18GA L125IN POLYUR STR RADPQ INTROCAN SFTY

## (undated) DEVICE — SYR SLPTP 20CC

## (undated) DEVICE — TUBE ET 7.5MM NSL ORAL BASIC CUF INTMED MURPHY EYE RADPQ

## (undated) DEVICE — COVER LT HNDL BLU PLAS

## (undated) DEVICE — NEPTUNE E-SEP SMOKE EVACUATION PENCIL, COATED, 70MM BLADE, ROCKER SWITCH: Brand: NEPTUNE E-SEP

## (undated) DEVICE — REFLEX ULTRA 45 WITH INTEGRATED CABLE: Brand: COBLATION

## (undated) DEVICE — GLOVE SURG SZ 75 CRM LTX FREE POLYISOPRENE POLYMER BEAD ANTI

## (undated) DEVICE — MINOR CDS: Brand: MEDLINE INDUSTRIES, INC.

## (undated) DEVICE — PK TURNOVER RM ADV